# Patient Record
Sex: FEMALE | Race: WHITE | NOT HISPANIC OR LATINO | Employment: FULL TIME | URBAN - METROPOLITAN AREA
[De-identification: names, ages, dates, MRNs, and addresses within clinical notes are randomized per-mention and may not be internally consistent; named-entity substitution may affect disease eponyms.]

---

## 2017-06-01 ENCOUNTER — OFFICE VISIT (OUTPATIENT)
Dept: FAMILY MEDICINE | Facility: CLINIC | Age: 22
End: 2017-06-01
Payer: COMMERCIAL

## 2017-06-01 VITALS
TEMPERATURE: 98.8 F | HEART RATE: 72 BPM | WEIGHT: 163 LBS | BODY MASS INDEX: 25.58 KG/M2 | HEIGHT: 67 IN | SYSTOLIC BLOOD PRESSURE: 110 MMHG | DIASTOLIC BLOOD PRESSURE: 82 MMHG

## 2017-06-01 DIAGNOSIS — Z23 NEED FOR VACCINATION: ICD-10-CM

## 2017-06-01 DIAGNOSIS — Z11.3 SCREEN FOR STD (SEXUALLY TRANSMITTED DISEASE): ICD-10-CM

## 2017-06-01 DIAGNOSIS — B07.0 PLANTAR WARTS: Primary | ICD-10-CM

## 2017-06-01 DIAGNOSIS — Z30.49 ENCOUNTER FOR SURVEILLANCE OF OTHER CONTRACEPTIVE: ICD-10-CM

## 2017-06-01 PROCEDURE — 99214 OFFICE O/P EST MOD 30 MIN: CPT | Mod: 25 | Performed by: NURSE PRACTITIONER

## 2017-06-01 PROCEDURE — 87491 CHLMYD TRACH DNA AMP PROBE: CPT | Performed by: NURSE PRACTITIONER

## 2017-06-01 PROCEDURE — 90714 TD VACC NO PRESV 7 YRS+ IM: CPT | Performed by: NURSE PRACTITIONER

## 2017-06-01 PROCEDURE — 87591 N.GONORRHOEAE DNA AMP PROB: CPT | Performed by: NURSE PRACTITIONER

## 2017-06-01 PROCEDURE — 90471 IMMUNIZATION ADMIN: CPT | Performed by: NURSE PRACTITIONER

## 2017-06-01 ASSESSMENT — ENCOUNTER SYMPTOMS
SINUS PRESSURE: 0
EYE ITCHING: 0
FEVER: 0
COUGH: 0
SORE THROAT: 0
NAUSEA: 0
WHEEZING: 0
DIZZINESS: 0
LIGHT-HEADEDNESS: 0
SHORTNESS OF BREATH: 0
CONSTIPATION: 0
CHILLS: 0
HEADACHES: 0
DIARRHEA: 0
DIAPHORESIS: 0
EYE DISCHARGE: 0
FATIGUE: 0
RHINORRHEA: 0

## 2017-06-01 NOTE — NURSING NOTE
"Chief Complaint   Patient presents with     Wart       Initial /82 (BP Location: Left arm, Patient Position: Chair, Cuff Size: Adult Regular)  Pulse 72  Temp 98.8  F (37.1  C) (Tympanic)  Ht 5' 6.5\" (1.689 m)  Wt 163 lb (73.9 kg)  LMP 05/10/2017  BMI 25.91 kg/m2 Estimated body mass index is 25.91 kg/(m^2) as calculated from the following:    Height as of this encounter: 5' 6.5\" (1.689 m).    Weight as of this encounter: 163 lb (73.9 kg).  Medication Reconciliation: complete     April NITZA Knapp      "

## 2017-06-01 NOTE — PROGRESS NOTES
SUBJECTIVE:                                                    Tammi Barry is a 22 year old female who presents to clinic today for the following health issues:      Warts  Duration of complaint: cluster of warts on ball of right foot for 2 years. 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        Has had warts to bottom of right foot for the last 2-3 years. Has done multiple treatment liquid (acid) to top of hit, froze them-multiple times, and then had surgery to remove it. When had it cut out, did look normal like went away but really does not think did go away because 2-3 weeks later came back in same location. Wart does not interfere with life, only hurts when puts direct pressure on area. Has had HPV series.     Currently has paragard. Does continue to get regular periods. No concern for sexually transmitted disease. No vaginal discharge, bloating, cramping or pain.     Has appointment next week with ENT regarding snoring and difficulty breathing out of nose.     Problem list and histories reviewed & adjusted, as indicated.  Additional history: as documented    Current Outpatient Prescriptions   Medication Sig Dispense Refill     paragard intrauterine copper 1 each by Intrauterine route once       No Known Allergies    Reviewed and updated as needed this visit by clinical staff  Tobacco  Allergies  Meds  Problems  Med Hx  Surg Hx  Fam Hx  Soc Hx        Reviewed and updated as needed this visit by Provider  Problems         ROS:  Review of Systems   Constitutional: Negative for chills, diaphoresis, fatigue and fever.   HENT: Negative for ear discharge, ear pain, hearing loss, rhinorrhea, sinus pressure and sore throat.    Eyes: Negative for discharge and itching.   Respiratory: Negative for cough, shortness of breath and wheezing.    Gastrointestinal: Negative for constipation, diarrhea and nausea.   Skin: Negative for rash.         "Wart bottom of right foot     Neurological: Negative for dizziness, light-headedness and headaches.         OBJECTIVE:                                                    /82 (BP Location: Left arm, Patient Position: Chair, Cuff Size: Adult Regular)  Pulse 72  Temp 98.8  F (37.1  C) (Tympanic)  Ht 5' 6.5\" (1.689 m)  Wt 163 lb (73.9 kg)  LMP 05/10/2017  BMI 25.91 kg/m2  Body mass index is 25.91 kg/(m^2).  Physical Exam   Constitutional: She appears well-developed and well-nourished.   Cardiovascular: Normal rate and regular rhythm.    Pulmonary/Chest: Effort normal and breath sounds normal.   Musculoskeletal:        Feet:    Neurological: She is alert.   Skin: Skin is warm and dry.            ASSESSMENT/PLAN:                                                    1. Screen for STD (sexually transmitted disease)  - Neisseria gonorrhoeae PCR  - Chlamydia trachomatis PCR    2. Plantar warts  - DERMATOLOGY REFERRAL    3. Encounter for surveillance of other contraceptive  Tolerating pargard without incident     4. Need for diphtheria-tetanus-pertussis (Tdap) vaccine  Administer today     JERICHO Stacy [facility] [address]      "

## 2017-06-01 NOTE — MR AVS SNAPSHOT
After Visit Summary   6/1/2017    Tammi Barry    MRN: [id number]           Patient Information     Date Of Birth          1995        Visit Information        Provider Department      6/1/2017 11:00 AM Shelbie Akers APRN CNP [facility]        Today's Diagnoses     Plantar warts    -  1    Screen for STD (sexually transmitted disease)        Encounter for surveillance of other contraceptive           Follow-ups after your visit        Additional Services     DERMATOLOGY REFERRAL       Your provider has referred you to: [facility]: [facility] [phone]   [email or web address]    Please be aware that coverage of these services is subject to the terms and limitations of your health insurance plan.  Call member services at your health plan with any benefit or coverage questions.      Please bring the following with you to your appointment:    (1) Any X-Rays, CTs or MRIs which have been performed.  Contact the facility where they were done to arrange for  prior to your scheduled appointment.    (2) List of current medications  (3) This referral request   (4) Any documents/labs given to you for this referral                  Your next 10 appointments already scheduled     Jun 12, 2017  1:15 PM CDT   New Visit with Katie Dash MD   [facility]    [address] [address] [phone]              Who to contact     Normal or non-critical lab and imaging results will be communicated to you by MyChart, letter or phone within 4 business days after the clinic has received the results. If you do not hear from us within 7 days, please contact the clinic through MyChart or phone. If you have a critical or abnormal lab result, we will notify you by phone as soon as possible.  Submit refill requests through [employer] or call your pharmacy and they will forward the  "refill request to us. Please allow 3 business days for your refill to be completed.          If you need to speak with a  for additional information , please call: [phone]           Additional Information About Your Visit        [employer] Information     [employer] gives you secure access to your electronic health record. If you see a primary care provider, you can also send messages to your care team and make appointments. If you have questions, please call your primary care clinic.  If you do not have a primary care provider, please call [phone] and they will assist you.        Care EveryWhere ID     This is your Care EveryWhere ID. This could be used by other organizations to access your [place] medical records  [id number]        Your Vitals Were     Pulse Temperature Height Last Period BMI (Body Mass Index)       72 98.8  F (37.1  C) (Tympanic) 5' 6.5\" (1.689 m) 05/10/2017 25.91 kg/m2        Blood Pressure from Last 3 Encounters:   06/01/17 110/82   07/12/16 110/80   01/05/16 122/80    Weight from Last 3 Encounters:   06/01/17 163 lb (73.9 kg)   07/12/16 158 lb 6.4 oz (71.8 kg)   01/05/16 141 lb (64 kg)              We Performed the Following     Chlamydia trachomatis PCR     DERMATOLOGY REFERRAL     Neisseria gonorrhoeae PCR        Primary Care Provider Office Phone #    [facility] [phone]       No address on file        Thank you!     Thank you for choosing [facility]  for your care. Our goal is always to provide you with excellent care. Hearing back from our patients is one way we can continue to improve our services. Please take a few minutes to complete the written survey that you may receive in the mail after your visit with us. Thank you!             Your Updated Medication List - Protect others around you: Learn how to safely use, store and throw away your medicines at www.disposemymeds.org.          This list is accurate as of: 6/1/17 11:28 " AM.  Always use your most recent med list.                   Brand Name Dispense Instructions for use    paragard intrauterine copper      1 each by Intrauterine route once

## 2017-06-02 LAB
C TRACH DNA SPEC QL NAA+PROBE: NORMAL
N GONORRHOEA DNA SPEC QL NAA+PROBE: NORMAL
SPECIMEN SOURCE: NORMAL
SPECIMEN SOURCE: NORMAL

## 2017-06-02 NOTE — PROGRESS NOTES
Tammi,     You are negative for sexually transmitted diseases. Please use condoms with every sexual encounter.    Please call or email with any additional questions or concerns  JERICHO Novak CNP

## 2017-06-12 ENCOUNTER — OFFICE VISIT (OUTPATIENT)
Dept: OTOLARYNGOLOGY | Facility: CLINIC | Age: 22
End: 2017-06-12
Payer: COMMERCIAL

## 2017-06-12 VITALS
DIASTOLIC BLOOD PRESSURE: 91 MMHG | BODY MASS INDEX: 25.91 KG/M2 | TEMPERATURE: 98.8 F | WEIGHT: 163 LBS | HEART RATE: 73 BPM | SYSTOLIC BLOOD PRESSURE: 136 MMHG

## 2017-06-12 DIAGNOSIS — G47.33 OSA (OBSTRUCTIVE SLEEP APNEA): Primary | ICD-10-CM

## 2017-06-12 DIAGNOSIS — J34.89 NASAL OBSTRUCTION: ICD-10-CM

## 2017-06-12 PROCEDURE — 99203 OFFICE O/P NEW LOW 30 MIN: CPT | Performed by: OTOLARYNGOLOGY

## 2017-06-12 RX ORDER — FLUTICASONE PROPIONATE 50 MCG
1-2 SPRAY, SUSPENSION (ML) NASAL DAILY
Qty: 16 G | Refills: 1 | Status: SHIPPED | OUTPATIENT
Start: 2017-06-12 | End: 2023-05-20

## 2017-06-12 ASSESSMENT — PAIN SCALES - GENERAL: PAINLEVEL: NO PAIN (0)

## 2017-06-12 NOTE — MR AVS SNAPSHOT
After Visit Summary   6/12/2017    Tammi Barry    MRN: [id number]           Patient Information     Date Of Birth          1995        Visit Information        Provider Department      6/12/2017 1:15 PM Katie Dash MD [facility]        Today's Diagnoses     FARZANA (obstructive sleep apnea)    -  1    Nasal obstruction          Care Instructions    Per physician's instructions            Follow-ups after your visit        Additional Services     SLEEP EVALUATION & MANAGEMENT REFERRAL - ADULT       Please be aware that coverage of these services is subject to the terms and limitations of your health insurance plan.  Call member services at your health plan with any benefit or coverage questions.      Please bring the following to your appointment:    >>   List of current medications   >>   This referral request   >>   Any documents/labs given to you for this referral    [facility] Lab  Ph [phone] (Age 2 and up)                  Future tests that were ordered for you today     Open Future Orders        Priority Expected Expires Ordered    SLEEP EVALUATION & MANAGEMENT REFERRAL - ADULT Routine  6/12/2018 6/12/2017            Who to contact     If you have questions or need follow up information about today's clinic visit or your schedule please contact [facility] directly at [phone].  Normal or non-critical lab and imaging results will be communicated to you by MyChart, letter or phone within 4 business days after the clinic has received the results. If you do not hear from us within 7 days, please contact the clinic through MyChart or phone. If you have a critical or abnormal lab result, we will notify you by phone as soon as possible.  Submit refill requests through [employer] or call your pharmacy and they will forward the refill request to us. Please allow 3 business days for your refill to be completed.          Additional Information  About Your Visit        [employer] Information     [email or web address] gives you secure access to your electronic health record. If you see a primary care provider, you can also send messages to your care team and make appointments. If you have questions, please call your primary care clinic.  If you do not have a primary care provider, please call [phone] and they will assist you.        Care EveryWhere ID     This is your Care EveryWhere ID. This could be used by other organizations to access your [place] medical records  [id number]        Your Vitals Were     Pulse Temperature Last Period BMI (Body Mass Index)          73 98.8  F (37.1  C) (Oral) 05/10/2017 25.91 kg/m2         Blood Pressure from Last 3 Encounters:   06/12/17 (!) 136/91   06/01/17 110/82   07/12/16 110/80    Weight from Last 3 Encounters:   06/12/17 73.9 kg (163 lb)   06/01/17 73.9 kg (163 lb)   07/12/16 71.8 kg (158 lb 6.4 oz)                 Today's Medication Changes          These changes are accurate as of: 6/12/17  8:36 PM.  If you have any questions, ask your nurse or doctor.               Start taking these medicines.        Dose/Directions    fluticasone 50 MCG/ACT spray   Commonly known as:  FLONASE   Used for:  Nasal obstruction   Started by:  Katie Dash MD        Dose:  1-2 spray   Spray 1-2 sprays into both nostrils daily   Quantity:  16 g   Refills:  1            Where to get your medicines      These medications were sent to [facility] [address]  [address] [address]     Phone:  [phone]     fluticasone 50 MCG/ACT spray                Primary Care Provider Office Phone #    [facility] [phone]       No address on file        Thank you!     Thank you for choosing [facility]  for your care. Our goal is always to provide you with excellent care. Hearing back from our patients is one way we can continue to improve our services. Please  take a few minutes to complete the written survey that you may receive in the mail after your visit with us. Thank you!             Your Updated Medication List - Protect others around you: Learn how to safely use, store and throw away your medicines at www.disposemymeds.org.          This list is accurate as of: 6/12/17  8:36 PM.  Always use your most recent med list.                   Brand Name Dispense Instructions for use    fluticasone 50 MCG/ACT spray    FLONASE    16 g    Spray 1-2 sprays into both nostrils daily       paragard intrauterine copper      1 each by Intrauterine route once

## 2017-06-12 NOTE — NURSING NOTE
"Initial BP (!) 136/91 (BP Location: Right arm, Patient Position: Chair, Cuff Size: Adult Regular)  Pulse 73  Temp 98.8  F (37.1  C) (Oral)  Wt 73.9 kg (163 lb)  LMP 05/10/2017  BMI 25.91 kg/m2 Estimated body mass index is 25.91 kg/(m^2) as calculated from the following:    Height as of 6/1/17: 1.689 m (5' 6.5\").    Weight as of this encounter: 73.9 kg (163 lb). .    Loulou Dexter LPN    "

## 2017-06-13 NOTE — PROGRESS NOTES
History of Present Illness - Tammi Barry is a 22 year old female who presents with a several year history of difficulty breathing through her nose and noisy breathing in her sleep with poor sleep quality. She denies nasal trauma. She is not using any nasal medications. She has realized improvement with breathe rite strips. She is currently a Senior at [employer].    Past Medical History -   Patient Active Problem List   Diagnosis     Contraception     Need for prophylactic vaccination and inoculation against influenza     Daytime sleepiness     Snoring       Current Medications -   Current Outpatient Prescriptions:      paragard intrauterine copper, 1 each by Intrauterine route once, Disp: , Rfl:     Allergies - No Known Allergies    Social History -   Social History     Social History     Marital status: Single     Spouse name: N/A     Number of children: N/A     Years of education: N/A     Social History Main Topics     Smoking status: Never Smoker     Smokeless tobacco: Never Used     Alcohol use Yes      Comment: occas     Drug use: No     Sexual activity: Yes     Partners: Male     Birth control/ protection: Condom     Other Topics Concern     None     Social History Narrative       Family History -   Family History   Problem Relation Age of Onset     Asthma Father      Hypertension Paternal Grandmother        Review of Systems - As per HPI and PMHx, otherwise 7 system review of the head and neck negative. 10+ system review negative.    Physical Exam  BP (!) 136/91 (BP Location: Right arm, Patient Position: Chair, Cuff Size: Adult Regular)  Pulse 73  Temp 98.8  F (37.1  C) (Oral)  Wt 73.9 kg (163 lb)  LMP 05/10/2017  BMI 25.91 kg/m2  General - The patient is well nourished and well developed, and appears to have good nutritional status.  Alert and oriented to person and place, answers questions and cooperates with examination appropriately.   Head and Face - Normocephalic and atraumatic, with no  gross asymmetry noted of the contour of the facial features.  The facial nerve is intact, with strong symmetric movements.  Voice and Breathing - The patient was breathing comfortably without the use of accessory muscles. There was no wheezing, stridor, or stertor.  The patients voice was clear and strong, and had appropriate pitch and quality.  Ears - Bilateral pinna and EACs with normal appearing overlying skin. Tympanic membrane intact with good mobility on pneumatic otoscopy bilaterally. Bony landmarks of the ossicular chain are normal. The tympanic membranes are normal in appearance. No retraction, perforation, or masses.  No fluid or purulence was seen in the external canal or the middle ear.   Eyes - Extraocular movements intact.  Sclera were not icteric or injected, conjunctiva were pink and moist.  Mouth - Examination of the oral cavity showed pink, healthy oral mucosa. No lesions or ulcerations noted.  The tongue was mobile and midline, and the dentition were in good condition.    Throat - The walls of the oropharynx were smooth, pink, moist, symmetric, and had no lesions or ulcerations.  The tonsillar pillars and soft palate were symmetric.  The uvula was midline on elevation.  Neck - Normal midline excursion of the laryngotracheal complex during swallowing.  Full range of motion on passive movement.  Palpation of the occipital, submental, submandibular, internal jugular chain, and supraclavicular nodes did not demonstrate any abnormal lymph nodes or masses.  The carotid pulse was palpable bilaterally.  Palpation of the thyroid was soft and smooth, with no nodules or goiter appreciated.  The trachea was mobile and midline.  Nose - External contour is symmetric, no gross deflection or scars.  Nasal mucosa is pink and moist with no abnormal mucus.  The septum was midline and non-obstructive, turbinates of normal size and position.  No polyps, masses, or purulence noted on examination.  Heart:  Regular rate  and rhythm, no murmurs.  Lungs:  Chest clear to auscultation bilaterally          Assessment - Tammi Barry is a 22 year old female with possible FARZANA. Nasal exam demonstrates some mild septal deviation, but not convincing as a cause of nocturnal difficulty breathing. I recommended trying a course of Flonase, and also use of a nasal stent. I also placed an order for a sleep referral. Return in 2 months.       Dr. Katie Dash MD  Otolaryngology  [facility]

## 2018-01-03 ENCOUNTER — OFFICE VISIT (OUTPATIENT)
Dept: FAMILY MEDICINE | Facility: CLINIC | Age: 23
End: 2018-01-03
Payer: COMMERCIAL

## 2018-01-03 VITALS
DIASTOLIC BLOOD PRESSURE: 70 MMHG | HEIGHT: 67 IN | BODY MASS INDEX: 23.7 KG/M2 | SYSTOLIC BLOOD PRESSURE: 130 MMHG | WEIGHT: 151 LBS | HEART RATE: 68 BPM | TEMPERATURE: 98.4 F

## 2018-01-03 DIAGNOSIS — D72.829 LEUKOCYTOSIS, UNSPECIFIED TYPE: ICD-10-CM

## 2018-01-03 DIAGNOSIS — N83.292 COMPLEX CYST OF LEFT OVARY: Primary | ICD-10-CM

## 2018-01-03 LAB
BASOPHILS # BLD AUTO: 0 10E9/L (ref 0–0.2)
BASOPHILS NFR BLD AUTO: 0.2 %
DIFFERENTIAL METHOD BLD: NORMAL
EOSINOPHIL # BLD AUTO: 0.2 10E9/L (ref 0–0.7)
EOSINOPHIL NFR BLD AUTO: 1.8 %
ERYTHROCYTE [DISTWIDTH] IN BLOOD BY AUTOMATED COUNT: 13.1 % (ref 10–15)
HCT VFR BLD AUTO: 37.8 % (ref 35–47)
HGB BLD-MCNC: 13.1 G/DL (ref 11.7–15.7)
LYMPHOCYTES # BLD AUTO: 2.8 10E9/L (ref 0.8–5.3)
LYMPHOCYTES NFR BLD AUTO: 31.5 %
MCH RBC QN AUTO: 30.3 PG (ref 26.5–33)
MCHC RBC AUTO-ENTMCNC: 34.7 G/DL (ref 31.5–36.5)
MCV RBC AUTO: 88 FL (ref 78–100)
MONOCYTES # BLD AUTO: 0.7 10E9/L (ref 0–1.3)
MONOCYTES NFR BLD AUTO: 8 %
NEUTROPHILS # BLD AUTO: 5.2 10E9/L (ref 1.6–8.3)
NEUTROPHILS NFR BLD AUTO: 58.5 %
PLATELET # BLD AUTO: 319 10E9/L (ref 150–450)
RBC # BLD AUTO: 4.32 10E12/L (ref 3.8–5.2)
WBC # BLD AUTO: 8.9 10E9/L (ref 4–11)

## 2018-01-03 PROCEDURE — 36415 COLL VENOUS BLD VENIPUNCTURE: CPT | Performed by: PHYSICIAN ASSISTANT

## 2018-01-03 PROCEDURE — 85025 COMPLETE CBC W/AUTO DIFF WBC: CPT | Performed by: PHYSICIAN ASSISTANT

## 2018-01-03 PROCEDURE — 99214 OFFICE O/P EST MOD 30 MIN: CPT | Performed by: PHYSICIAN ASSISTANT

## 2018-01-03 NOTE — PATIENT INSTRUCTIONS
Please call [facility] Scheduling at [phone]  to set up the pelvic ultrasound (late Feb/Early March)

## 2018-01-03 NOTE — MR AVS SNAPSHOT
After Visit Summary   1/3/2018    Tammi Barry    MRN: [id number]           Patient Information     Date Of Birth          1995        Visit Information        Provider Department      1/3/2018 8:20 AM Neela Styles PA-C [facility]        Today's Diagnoses     Complex cyst of left ovary    -  1      Care Instructions    Please call [facility] Scheduling at [phone]  to set up the pelvic ultrasound (late Feb/Early March)            Follow-ups after your visit        Future tests that were ordered for you today     Open Future Orders        Priority Expected Expires Ordered    US Pelvic Complete w Transvaginal Routine 2/21/2018 1/3/2019 1/3/2018            Who to contact     Normal or non-critical lab and imaging results will be communicated to you by [employer], letter or phone within 4 business days after the clinic has received the results. If you do not hear from us within 7 days, please contact the clinic through [employer] or phone. If you have a critical or abnormal lab result, we will notify you by phone as soon as possible.  Submit refill requests through [employer] or call your pharmacy and they will forward the refill request to us. Please allow 3 business days for your refill to be completed.          If you need to speak with a  for additional information , please call: [phone]           Additional Information About Your Visit        [employer] Information     [employer] gives you secure access to your electronic health record. If you see a primary care provider, you can also send messages to your care team and make appointments. If you have questions, please call your primary care clinic.  If you do not have a primary care provider, please call [phone] and they will assist you.        Care EveryWhere ID     This is your Care EveryWhere ID. This could be used by other organizations to access your [place] medical records  [id number]    "     Your Vitals Were     Pulse Temperature Height BMI (Body Mass Index)          68 98.4  F (36.9  C) (Tympanic) 5' 6.5\" (1.689 m) 24.01 kg/m2         Blood Pressure from Last 3 Encounters:   01/03/18 130/70   06/12/17 (!) 136/91   06/01/17 110/82    Weight from Last 3 Encounters:   01/03/18 151 lb (68.5 kg)   06/12/17 163 lb (73.9 kg)   06/01/17 163 lb (73.9 kg)              We Performed the Following     CBC with platelets differential        Primary Care Provider Office Phone # Fax #    [facility] [phone] [address] [address]        Equal Access to Services     MARGIE KNOTT : Mahi flowers Sosandra, waaxda luqadaha, qaybta kaalmada adeegyada, asya chavez. So [facility] [phone].    ATENCIÓN: Si habla español, tiene a mendoza disposición servicios gratuitos de asistencia lingüística. Llame al [phone].    We comply with applicable federal civil rights laws and [place] laws. We do not discriminate on the basis of race, color, national origin, age, disability, sex, sexual orientation, or gender identity.            Thank you!     Thank you for choosing [facility]  for your care. Our goal is always to provide you with excellent care. Hearing back from our patients is one way we can continue to improve our services. Please take a few minutes to complete the written survey that you may receive in the mail after your visit with us. Thank you!             Your Updated Medication List - Protect others around you: Learn how to safely use, store and throw away your medicines at www.disposemymeds.org.          This list is accurate as of: 1/3/18  9:12 AM.  Always use your most recent med list.                   Brand Name Dispense Instructions for use Diagnosis    fluticasone 50 MCG/ACT spray    FLONASE    16 g    Spray 1-2 sprays into both nostrils daily    Nasal obstruction       paragard intrauterine copper     "  1 each by Intrauterine route once

## 2018-01-03 NOTE — PROGRESS NOTES
SUBJECTIVE:   Tammi Barry is a 22 year old female who presents to clinic today for the following health issues:      ED/UC Followup:    Facility:  [facility].   Date of visit: 12/28/17  Reason for visit: ovarian cyst, urine, blood work, CT  Current Status: pelvic pain comes and goes.  She has not used the stronger pain meds given at .       White count was elevated at 16.8 on 12/28/17.  No fevers.  Currently on naproxen BID x 15 days.    CT scan:   IMPRESSION:      1. There is a 4.2 x 2.9 x 2.2 cm mildly complex left ovarian cyst which may be hemorrhagic. Small amount of complex fluid in the pelvis may contain hemorrhage or proteinaceous debris from ovarian cyst rupture. Consider follow-up pelvic ultrasound in 6-12 weeks to evaluate for resolution.    2. An IUD appears in satisfactory position in the uterus.    3. The appendix is normal.            Problem list and histories reviewed & adjusted, as indicated.  Additional history: as documented    Patient Active Problem List   Diagnosis     Contraception     Need for prophylactic vaccination and inoculation against influenza     Daytime sleepiness     Snoring     Past Surgical History:   Procedure Laterality Date     ELBOW SURGERY  2009    titanium pin in place       Social History   Substance Use Topics     Smoking status: Never Smoker     Smokeless tobacco: Never Used     Alcohol use Yes      Comment: occas     Family History   Problem Relation Age of Onset     Asthma Father      Hypertension Paternal Grandmother          Current Outpatient Prescriptions   Medication Sig Dispense Refill     fluticasone (FLONASE) 50 MCG/ACT spray Spray 1-2 sprays into both nostrils daily 16 g 1     paragard intrauterine copper 1 each by Intrauterine route once       BP Readings from Last 3 Encounters:   01/03/18 130/70   06/12/17 (!) 136/91   06/01/17 110/82    Wt Readings from Last 3 Encounters:   01/03/18 151 lb (68.5 kg)   06/12/17 163 lb (73.9  "kg)   06/01/17 163 lb (73.9 kg)                        Reviewed and updated as needed this visit by clinical staffTobacco  Allergies  Meds  Med Hx  Surg Hx  Fam Hx  Soc Hx      Reviewed and updated as needed this visit by Provider         ROS:  Constitutional, HEENT, cardiovascular, pulmonary, gi and gu systems are negative, except as otherwise noted.      OBJECTIVE:   /70  Pulse 68  Temp 98.4  F (36.9  C) (Tympanic)  Ht 5' 6.5\" (1.689 m)  Wt 151 lb (68.5 kg)  BMI 24.01 kg/m2  Body mass index is 24.01 kg/(m^2).  GENERAL: healthy, alert and no distress  HEENT:  Normocephalic, atraumatic head.  Eyelids, conjunctiva, sclera normal. PERRL.  Fundal exam normal.  Tympanic membranes shiny without retraction.  Canals unremarkable.  Hearing grossly intact.  No abnormality of the nose or sinuses noted.  Normal oropharynx  ABDOMEN: soft, nontender, no hepatosplenomegaly, no masses and bowel sounds normal    Diagnostic Test Results:  none     ASSESSMENT/PLAN:       1. Complex cyst of left ovary  Recheck pelvic ultrasound in 6-12 weeks.  Pain is improving.  Discussed worrisome signs that would indicate urgent f/u (ovarian torsion).   - US Pelvic Complete w Transvaginal; Future    2. Leukocytosis, unspecified type  Likely due to inflammation from ovarian cyst, CT negative otherwise.  No signs of ENT infection.  Will recheck CBC today, continue with naproxen for now.   - CBC with platelets differential    FUTURE APPOINTMENTS:       - Follow-up US in 6-12 weeks.     Neela Styles PA-C  [facility]"

## 2018-01-03 NOTE — NURSING NOTE
"Chief Complaint   Patient presents with     Urgent Care     follow up       Initial /70  Pulse 68  Temp 98.4  F (36.9  C) (Tympanic)  Ht 5' 6.5\" (1.689 m)  Wt 151 lb (68.5 kg)  BMI 24.01 kg/m2 Estimated body mass index is 24.01 kg/(m^2) as calculated from the following:    Height as of this encounter: 5' 6.5\" (1.689 m).    Weight as of this encounter: 151 lb (68.5 kg).  Medication Reconciliation: complete   Marjorie Jacome CMA    "

## 2018-02-09 ENCOUNTER — OFFICE VISIT (OUTPATIENT)
Dept: FAMILY MEDICINE | Facility: CLINIC | Age: 23
End: 2018-02-09
Payer: COMMERCIAL

## 2018-02-09 VITALS
TEMPERATURE: 98 F | SYSTOLIC BLOOD PRESSURE: 105 MMHG | WEIGHT: 143.8 LBS | HEIGHT: 67 IN | BODY MASS INDEX: 22.57 KG/M2 | DIASTOLIC BLOOD PRESSURE: 70 MMHG

## 2018-02-09 DIAGNOSIS — N83.202 OVARIAN CYST, LEFT: Primary | ICD-10-CM

## 2018-02-09 PROCEDURE — 99213 OFFICE O/P EST LOW 20 MIN: CPT | Performed by: PHYSICIAN ASSISTANT

## 2018-02-09 NOTE — MR AVS SNAPSHOT
"              After Visit Summary   2/9/2018    Tammi Barry    MRN: [id number]           Patient Information     Date Of Birth          1995        Visit Information        Provider Department      2/9/2018 10:20 AM Neela Styles PA-C [facility] Instructions    Please call [facility] Scheduling at [phone]  to set up the pelvic ultrasound            Follow-ups after your visit        Who to contact     Normal or non-critical lab and imaging results will be communicated to you by One-Songhart, letter or phone within 4 business days after the clinic has received the results. If you do not hear from us within 7 days, please contact the clinic through C[employer] Podium[employer] or phone. If you have a critical or abnormal lab result, we will notify you by phone as soon as possible.  Submit refill requests through vendome 1699 or call your pharmacy and they will forward the refill request to us. Please allow 3 business days for your refill to be completed.          If you need to speak with a  for additional information , please call: [phone]           Additional Information About Your Visit        One-SongharbCODE Information     vendome 1699 gives you secure access to your electronic health record. If you see a primary care provider, you can also send messages to your care team and make appointments. If you have questions, please call your primary care clinic.  If you do not have a primary care provider, please call [phone] and they will assist you.        Care EveryWhere ID     This is your Care EveryWhere ID. This could be used by other organizations to access your [place] medical records  [id number]        Your Vitals Were     Temperature Height Last Period Breastfeeding? BMI (Body Mass Index)       98  F (36.7  C) (Tympanic) 5' 6.5\" (1.689 m) 01/05/2018 (Approximate) No 22.86 kg/m2        Blood Pressure from Last 3 Encounters:   02/09/18 105/70   01/03/18 130/70   06/12/17 " (!) 136/91    Weight from Last 3 Encounters:   02/09/18 143 lb 12.8 oz (65.2 kg)   01/03/18 151 lb (68.5 kg)   06/12/17 163 lb (73.9 kg)              Today, you had the following     No orders found for display       Primary Care Provider Office Phone # Fax #    [facility] [phone] [address] [facility] [address]        Equal Access to Services     MARGIE KNOTT : Hadii aad ku hadasho Soomaali, waaxda luqadaha, qaybta kaalmada adeegyada, waxay idiin hayaan adeeg kharalinda lawandy chavez. So [facility] [phone].    ATENCIÓN: Si habla español, tiene a mendoza disposición servicios gratuitos de asistencia lingüística. Llame al [phone].    We comply with applicable federal civil rights laws and [place] laws. We do not discriminate on the basis of race, color, national origin, age, disability, sex, sexual orientation, or gender identity.            Thank you!     Thank you for choosing [facility]  for your care. Our goal is always to provide you with excellent care. Hearing back from our patients is one way we can continue to improve our services. Please take a few minutes to complete the written survey that you may receive in the mail after your visit with us. Thank you!             Your Updated Medication List - Protect others around you: Learn how to safely use, store and throw away your medicines at www.disposemymeds.org.          This list is accurate as of 2/9/18 10:38 AM.  Always use your most recent med list.                   Brand Name Dispense Instructions for use Diagnosis    fluticasone 50 MCG/ACT spray    FLONASE    16 g    Spray 1-2 sprays into both nostrils daily    Nasal obstruction       paragard intrauterine copper      1 each by Intrauterine route once

## 2018-02-09 NOTE — NURSING NOTE
"Chief Complaint   Patient presents with     RECHECK       Initial /70  Temp 98  F (36.7  C) (Tympanic)  Ht 5' 6.5\" (1.689 m)  Wt 143 lb 12.8 oz (65.2 kg)  LMP 01/05/2018 (Approximate)  Breastfeeding? No  BMI 22.86 kg/m2 Estimated body mass index is 22.86 kg/(m^2) as calculated from the following:    Height as of this encounter: 5' 6.5\" (1.689 m).    Weight as of this encounter: 143 lb 12.8 oz (65.2 kg).  Medication Reconciliation: complete     Naima Delcid MA      "

## 2018-02-09 NOTE — PROGRESS NOTES
"  SUBJECTIVE:   Tammi Barry is a 23 year old female who presents to clinic today for the following health issues:      Pt presents for follow up on ruptured ovarian cyst.    Has IUD with irregular spotting recently.  Otherwise no pain and no discharge.           Problem list and histories reviewed & adjusted, as indicated.  Additional history: as documented    Patient Active Problem List   Diagnosis     Contraception     Need for prophylactic vaccination and inoculation against influenza     Daytime sleepiness     Snoring     Past Surgical History:   Procedure Laterality Date     ELBOW SURGERY  2009    titanium pin in place       Social History   Substance Use Topics     Smoking status: Never Smoker     Smokeless tobacco: Never Used     Alcohol use Yes      Comment: occas     Family History   Problem Relation Age of Onset     Asthma Father      Hypertension Paternal Grandmother          Current Outpatient Prescriptions   Medication Sig Dispense Refill     paragard intrauterine copper 1 each by Intrauterine route once       fluticasone (FLONASE) 50 MCG/ACT spray Spray 1-2 sprays into both nostrils daily (Patient not taking: Reported on 2/9/2018) 16 g 1     BP Readings from Last 3 Encounters:   02/09/18 105/70   01/03/18 130/70   06/12/17 (!) 136/91    Wt Readings from Last 3 Encounters:   02/09/18 143 lb 12.8 oz (65.2 kg)   01/03/18 151 lb (68.5 kg)   06/12/17 163 lb (73.9 kg)                    Reviewed and updated as needed this visit by clinical staff       Reviewed and updated as needed this visit by Provider         ROS:  Constitutional, HEENT, cardiovascular, pulmonary, gi and gu systems are negative, except as otherwise noted.    OBJECTIVE:     /70  Temp 98  F (36.7  C) (Tympanic)  Ht 5' 6.5\" (1.689 m)  Wt 143 lb 12.8 oz (65.2 kg)  LMP 01/05/2018 (Approximate)  Breastfeeding? No  BMI 22.86 kg/m2  Body mass index is 22.86 kg/(m^2).  GENERAL: healthy, alert and no distress  ABDOMEN: soft, " nontender, no hepatosplenomegaly, no masses and bowel sounds normal    Diagnostic Test Results:  none     ASSESSMENT/PLAN:       1. Ovarian cyst, left  Will recheck US to ensure cyst has cleared.  Number given to schedule this.       FUTURE APPOINTMENTS:       - Follow-up visit in if symptoms worsen or fail to improve as anticipated.     Neela Styles PA-C  [facility]

## 2019-10-22 NOTE — PATIENT INSTRUCTIONS
Please call [facility] Scheduling at [phone]  to set up the pelvic ultrasound (to ensure the ovarian cyst has resolved)    Preventive Health Recommendations  Female Ages 21 to 25     Yearly exam:     See your health care provider every year in order to  o Review health changes.   o Discuss preventive care.    o Review your medicines if your doctor has prescribed any.      You should be tested each year for STDs (sexually transmitted diseases).       Talk to your provider about how often you should have cholesterol testing.      Get a Pap test every three years. If you have an abnormal result, your doctor may have you test more often.      If you are at risk for diabetes, you should have a diabetes test (fasting glucose).     Shots:     Get a flu shot each year.     Get a tetanus shot every 10 years.     Consider getting the shot (vaccine) that prevents cervical cancer (Gardasil).    Nutrition:     Eat at least 5 servings of fruits and vegetables each day.    Eat whole-grain bread, whole-wheat pasta and brown rice instead of white grains and rice.    Get adequate Calcium and Vitamin D.     Lifestyle    Exercise at least 150 minutes a week each week (30 minutes a day, 5 days a week). This will help you control your weight and prevent disease.    Limit alcohol to one drink per day.    No smoking.     Wear sunscreen to prevent skin cancer.    See your dentist every six months for an exam and cleaning.

## 2019-10-22 NOTE — PROGRESS NOTES
SUBJECTIVE:   CC: Tammi Barry is an 24 year old woman who presents for preventive health visit.   *Declined influenza vaccine- may get through employer  Healthy Habits:    Do you get at least three servings of calcium containing foods daily (dairy, green leafy vegetables, etc.)? yes    Amount of exercise or daily activities, outside of work: 1-2 day(s) per week    Problems taking medications regularly not applicable    Medication side effects: No    Have you had an eye exam in the past two years? yes    Do you see a dentist twice per year? yes    Do you have sleep apnea, excessive snoring or daytime drowsiness?no    Today's PHQ-2 Score:   PHQ-2 ( 1999 Pfizer) 10/23/2019 1/5/2016   Q1: Little interest or pleasure in doing things 0 0   Q2: Feeling down, depressed or hopeless 0 0   PHQ-2 Score 0 0     Abuse: Current or Past(Physical, Sexual or Emotional)- No  Do you feel safe in your environment? Yes    Social History     Tobacco Use     Smoking status: Never Smoker     Smokeless tobacco: Never Used   Substance Use Topics     Alcohol use: Yes     Comment: occas     If you drink alcohol do you typically have >3 drinks per day or >7 drinks per week? No                     Reviewed orders with patient.  Reviewed health maintenance and updated orders accordingly - Yes  BP Readings from Last 3 Encounters:   10/23/19 102/68   02/09/18 105/70   01/03/18 130/70    Wt Readings from Last 3 Encounters:   10/23/19 72.6 kg (160 lb)   02/09/18 65.2 kg (143 lb 12.8 oz)   01/03/18 68.5 kg (151 lb)         Mammogram not appropriate for this patient based on age.    Pertinent mammograms are reviewed under the imaging tab.  History of abnormal Pap smear: NO - age 21-29 PAP every 3 years recommended  PAP / HPV 7/12/2016   PAP NIL     Reviewed and updated as needed this visit by clinical staff  Tobacco  Allergies  Meds  Med Hx  Surg Hx  Fam Hx  Soc Hx     Ally DeShong CMA    Reviewed and updated as needed this visit by  "Provider            ROS:  CONSTITUTIONAL: NEGATIVE for fever, chills, change in weight  INTEGUMENTARU/SKIN: NEGATIVE for worrisome rashes, moles or lesions  EYES: NEGATIVE for vision changes or irritation  ENT: NEGATIVE for ear, mouth and throat problems  RESP: NEGATIVE for significant cough or SOB  BREAST: NEGATIVE for masses, tenderness or discharge  CV: NEGATIVE for chest pain, palpitations or peripheral edema  GI: NEGATIVE for nausea, abdominal pain, heartburn, or change in bowel habits  : NEGATIVE for unusual urinary or vaginal symptoms. Periods are regular.  MUSCULOSKELETAL: NEGATIVE for significant arthralgias or myalgia  NEURO: NEGATIVE for weakness, dizziness or paresthesias  PSYCHIATRIC: NEGATIVE for changes in mood or affect    OBJECTIVE:   /68   Pulse 71   Temp 98.3  F (36.8  C) (Tympanic)   Resp 16   Ht 1.695 m (5' 6.75\")   Wt 72.6 kg (160 lb)   LMP 10/02/2019 (Within Days)   SpO2 98%   Breastfeeding? No   BMI 25.25 kg/m    EXAM:  GENERAL: healthy, alert and no distress  EYES: Eyes grossly normal to inspection, PERRL and conjunctivae and sclerae normal  HENT: ear canals and TM's normal, nose and mouth without ulcers or lesions  NECK: no adenopathy, no asymmetry, masses, or scars and thyroid normal to palpation  RESP: lungs clear to auscultation - no rales, rhonchi or wheezes  BREAST: normal without masses, tenderness or nipple discharge and no palpable axillary masses or adenopathy  CV: regular rate and rhythm, normal S1 S2, no S3 or S4, no murmur, click or rub, no peripheral edema and peripheral pulses strong  ABDOMEN: soft, nontender, no hepatosplenomegaly, no masses and bowel sounds normal   (female): normal female external genitalia, normal urethral meatus, vaginal mucosa pink, moist, well rugated, and normal cervix/adnexa/uterus without masses or discharge, IUD in place  MS: no gross musculoskeletal defects noted, no edema  SKIN: no suspicious lesions or rashes  NEURO: Normal " "strength and tone, mentation intact and speech normal  PSYCH: mentation appears normal, affect normal/bright    Diagnostic Test Results:  none     ASSESSMENT/PLAN:   1. Routine general medical examination at a health care facility       HEALTH CARE MAINTENANCE              Reviewed USPTF recommendations and anticipatory guidance.              See orders.   I discussed in detail with Tammi Barry the importance of cervical cancer screenings through pap smears, will repeat pap every 3 year(s).           - NEISSERIA GONORRHOEA PCR  - CHLAMYDIA TRACHOMATIS PCR  - paragard intrauterine copper device; 1 each by Intrauterine route continuous    2. Screening for cervical cancer  Due for screening  - PAP imaged thin layer screen only - recommended age 21 - 24 years    3. Need for prophylactic vaccination and inoculation against influenza  Will have this done at work    4. Ovarian cyst, left  Due for f/u  - US Pelvic Complete w Transvaginal; Future    5. IUD (intrauterine device) in place  Tolerating well      COUNSELING:   Reviewed preventive health counseling, as reflected in patient instructions       Regular exercise       Healthy diet/nutrition       Contraception    Estimated body mass index is 25.25 kg/m  as calculated from the following:    Height as of this encounter: 1.695 m (5' 6.75\").    Weight as of this encounter: 72.6 kg (160 lb).         reports that she has never smoked. She has never used smokeless tobacco.      Counseling Resources:  ATP IV Guidelines  Pooled Cohorts Equation Calculator  Breast Cancer Risk Calculator  FRAX Risk Assessment  ICSI Preventive Guidelines  Dietary Guidelines for Americans, 2010  USDA's MyPlate  ASA Prophylaxis  Lung CA Screening    Neela Styles PA-C  [facility]  "

## 2019-10-23 ENCOUNTER — OFFICE VISIT (OUTPATIENT)
Dept: FAMILY MEDICINE | Facility: CLINIC | Age: 24
End: 2019-10-23
Payer: COMMERCIAL

## 2019-10-23 VITALS
TEMPERATURE: 98.3 F | HEART RATE: 71 BPM | OXYGEN SATURATION: 98 % | RESPIRATION RATE: 16 BRPM | DIASTOLIC BLOOD PRESSURE: 68 MMHG | WEIGHT: 160 LBS | BODY MASS INDEX: 25.11 KG/M2 | SYSTOLIC BLOOD PRESSURE: 102 MMHG | HEIGHT: 67 IN

## 2019-10-23 DIAGNOSIS — Z23 NEED FOR PROPHYLACTIC VACCINATION AND INOCULATION AGAINST INFLUENZA: ICD-10-CM

## 2019-10-23 DIAGNOSIS — Z97.5 IUD (INTRAUTERINE DEVICE) IN PLACE: ICD-10-CM

## 2019-10-23 DIAGNOSIS — N83.202 OVARIAN CYST, LEFT: ICD-10-CM

## 2019-10-23 DIAGNOSIS — Z00.00 ROUTINE GENERAL MEDICAL EXAMINATION AT A HEALTH CARE FACILITY: Primary | ICD-10-CM

## 2019-10-23 DIAGNOSIS — Z12.4 SCREENING FOR CERVICAL CANCER: ICD-10-CM

## 2019-10-23 PROCEDURE — 87491 CHLMYD TRACH DNA AMP PROBE: CPT | Performed by: PHYSICIAN ASSISTANT

## 2019-10-23 PROCEDURE — 87591 N.GONORRHOEAE DNA AMP PROB: CPT | Performed by: PHYSICIAN ASSISTANT

## 2019-10-23 PROCEDURE — G0145 SCR C/V CYTO,THINLAYER,RESCR: HCPCS | Performed by: PHYSICIAN ASSISTANT

## 2019-10-23 PROCEDURE — 99395 PREV VISIT EST AGE 18-39: CPT | Performed by: PHYSICIAN ASSISTANT

## 2019-10-23 RX ORDER — COPPER 313.4 MG/1
1 INTRAUTERINE DEVICE INTRAUTERINE CONTINUOUS
Start: 2013-01-15

## 2019-10-23 ASSESSMENT — MIFFLIN-ST. JEOR: SCORE: 1504.42

## 2019-10-24 LAB
C TRACH DNA SPEC QL NAA+PROBE: NEGATIVE
N GONORRHOEA DNA SPEC QL NAA+PROBE: NEGATIVE
SPECIMEN SOURCE: NORMAL
SPECIMEN SOURCE: NORMAL

## 2019-10-25 LAB
COPATH REPORT: NORMAL
PAP: NORMAL

## 2019-10-29 ENCOUNTER — ANCILLARY PROCEDURE (OUTPATIENT)
Dept: ULTRASOUND IMAGING | Facility: CLINIC | Age: 24
End: 2019-10-29
Attending: PHYSICIAN ASSISTANT
Payer: COMMERCIAL

## 2019-10-29 DIAGNOSIS — N83.202 OVARIAN CYST, LEFT: ICD-10-CM

## 2019-10-29 PROCEDURE — 76856 US EXAM PELVIC COMPLETE: CPT

## 2019-10-29 PROCEDURE — 76830 TRANSVAGINAL US NON-OB: CPT

## 2019-11-07 NOTE — PROGRESS NOTES
Subjective     Tammi Barry is a 24 year old female who presents to clinic today for the following health issues:    HPI     Followup Pelvic Ultrasound results from 10/29/19    Delphine Caldwell CMA    She had an ultrasound in college a few years ago at [facility] and noted to have an ultrasound at that time.      Her periods were irregular in the past, currently she only has irregular spotting.      She has a paraguard IUD (5 years ago) in and periods irregular.  She doesn't recall the last time she had a full normal period in the past few years.     Has had difficulty losing weight for the past few years.    She was on OCPs in the past, however she did not tolerate those hormones very well.          Patient Active Problem List   Diagnosis     Contraception     Need for prophylactic vaccination and inoculation against influenza     Daytime sleepiness     Snoring     IUD (intrauterine device) in place     Ovarian cyst, left     PCOS (polycystic ovarian syndrome)     Past Surgical History:   Procedure Laterality Date     ELBOW SURGERY  2009    titanium pin in place       Social History     Tobacco Use     Smoking status: Never Smoker     Smokeless tobacco: Never Used   Substance Use Topics     Alcohol use: Yes     Comment: occas     Family History   Problem Relation Age of Onset     Asthma Father      Hypertension Paternal Grandmother          Current Outpatient Medications   Medication Sig Dispense Refill     metFORMIN (GLUCOPHAGE-XR) 500 MG 24 hr tablet Take 1 tablet (500 mg) by mouth daily (with dinner) 60 tablet 2     paragard intrauterine copper device 1 each by Intrauterine route continuous       fluticasone (FLONASE) 50 MCG/ACT spray Spray 1-2 sprays into both nostrils daily (Patient not taking: Reported on 2/9/2018) 16 g 1     BP Readings from Last 3 Encounters:   11/08/19 118/72   10/23/19 102/68   02/09/18 105/70    Wt Readings from Last 3 Encounters:   11/08/19 72.6 kg (160 lb)   10/23/19 72.6 kg (160 lb)    02/09/18 65.2 kg (143 lb 12.8 oz)                      Reviewed and updated as needed this visit by Provider         Review of Systems   ROS COMP: Constitutional, HEENT, cardiovascular, pulmonary, gi and gu systems are negative, except as otherwise noted.      Objective    There were no vitals taken for this visit.  There is no height or weight on file to calculate BMI.  Physical Exam   GENERAL: healthy, alert and no distress    Diagnostic Test Results:  Labs reviewed in Epic  Results for orders placed or performed in visit on 11/08/19   Comprehensive metabolic panel     Status: None   Result Value Ref Range    Sodium 136 133 - 144 mmol/L    Potassium 3.9 3.4 - 5.3 mmol/L    Chloride 104 94 - 109 mmol/L    Carbon Dioxide 22 20 - 32 mmol/L    Anion Gap 10 3 - 14 mmol/L    Glucose 81 70 - 99 mg/dL    Urea Nitrogen 17 7 - 30 mg/dL    Creatinine 0.54 0.52 - 1.04 mg/dL    GFR Estimate >90 >60 mL/min/[1.73_m2]    GFR Estimate If Black >90 >60 mL/min/[1.73_m2]    Calcium 9.2 8.5 - 10.1 mg/dL    Bilirubin Total 0.6 0.2 - 1.3 mg/dL    Albumin 4.4 3.4 - 5.0 g/dL    Protein Total 8.8 6.8 - 8.8 g/dL    Alkaline Phosphatase 79 40 - 150 U/L    ALT 29 0 - 50 U/L    AST 24 0 - 45 U/L   Hemoglobin A1c     Status: None   Result Value Ref Range    Hemoglobin A1C 4.9 0 - 5.6 %   TSH with free T4 reflex     Status: None   Result Value Ref Range    TSH 1.46 0.40 - 4.00 mU/L           Assessment & Plan     1. PCOS (polycystic ovarian syndrome)  Reviewed signs/symptoms of this condition including the pathophysiology, expected course and treatment options.    Will continue with paraguard, may consider birth control pills or Mirena in the future.    Will monitor glucose and lipid levels.     Her questions about this condition were answered today.   - Comprehensive metabolic panel  - Hemoglobin A1c  - TSH with free T4 reflex  - metFORMIN (GLUCOPHAGE-XR) 500 MG 24 hr tablet; Take 1 tablet (500 mg) by mouth daily (with dinner)  Dispense: 60  "tablet; Refill: 2    2. IUD (intrauterine device) in place  currently    3. Overweight (BMI 25.0-29.9)  Discussed with Tammi that PCOS can make it more difficult for her to lose weight.  She will continue to work on healthy diet and lifestyle changes.  May consider metformin to help with insulin resistance as well.      Reviewed side effects of Metformin.        BMI:   Estimated body mass index is 25.25 kg/m  as calculated from the following:    Height as of this encounter: 1.695 m (5' 6.75\").    Weight as of this encounter: 72.6 kg (160 lb).   Weight management plan: Discussed healthy diet and exercise guidelines            Return in about 3 months (around 2/8/2020) for lab/med recheck.    Approximately 25 minutes were spent face-to-face with patient and greater than 50% of that time was spent on counseling and coordination of care for the above issues.      Neela Styles PA-C  [facility]          "

## 2019-11-08 ENCOUNTER — OFFICE VISIT (OUTPATIENT)
Dept: FAMILY MEDICINE | Facility: CLINIC | Age: 24
End: 2019-11-08
Payer: COMMERCIAL

## 2019-11-08 VITALS
TEMPERATURE: 98 F | BODY MASS INDEX: 25.11 KG/M2 | OXYGEN SATURATION: 99 % | WEIGHT: 160 LBS | HEIGHT: 67 IN | SYSTOLIC BLOOD PRESSURE: 118 MMHG | RESPIRATION RATE: 16 BRPM | DIASTOLIC BLOOD PRESSURE: 72 MMHG | HEART RATE: 88 BPM

## 2019-11-08 DIAGNOSIS — E28.2 PCOS (POLYCYSTIC OVARIAN SYNDROME): Primary | ICD-10-CM

## 2019-11-08 DIAGNOSIS — E66.3 OVERWEIGHT (BMI 25.0-29.9): ICD-10-CM

## 2019-11-08 DIAGNOSIS — Z97.5 IUD (INTRAUTERINE DEVICE) IN PLACE: ICD-10-CM

## 2019-11-08 LAB
ALBUMIN SERPL-MCNC: 4.4 G/DL (ref 3.4–5)
ALP SERPL-CCNC: 79 U/L (ref 40–150)
ALT SERPL W P-5'-P-CCNC: 29 U/L (ref 0–50)
ANION GAP SERPL CALCULATED.3IONS-SCNC: 10 MMOL/L (ref 3–14)
AST SERPL W P-5'-P-CCNC: 24 U/L (ref 0–45)
BILIRUB SERPL-MCNC: 0.6 MG/DL (ref 0.2–1.3)
BUN SERPL-MCNC: 17 MG/DL (ref 7–30)
CALCIUM SERPL-MCNC: 9.2 MG/DL (ref 8.5–10.1)
CHLORIDE SERPL-SCNC: 104 MMOL/L (ref 94–109)
CO2 SERPL-SCNC: 22 MMOL/L (ref 20–32)
CREAT SERPL-MCNC: 0.54 MG/DL (ref 0.52–1.04)
GFR SERPL CREATININE-BSD FRML MDRD: >90 ML/MIN/{1.73_M2}
GLUCOSE SERPL-MCNC: 81 MG/DL (ref 70–99)
HBA1C MFR BLD: 4.9 % (ref 0–5.6)
POTASSIUM SERPL-SCNC: 3.9 MMOL/L (ref 3.4–5.3)
PROT SERPL-MCNC: 8.8 G/DL (ref 6.8–8.8)
SODIUM SERPL-SCNC: 136 MMOL/L (ref 133–144)
TSH SERPL DL<=0.005 MIU/L-ACNC: 1.46 MU/L (ref 0.4–4)

## 2019-11-08 PROCEDURE — 83036 HEMOGLOBIN GLYCOSYLATED A1C: CPT | Performed by: PHYSICIAN ASSISTANT

## 2019-11-08 PROCEDURE — 80053 COMPREHEN METABOLIC PANEL: CPT | Performed by: PHYSICIAN ASSISTANT

## 2019-11-08 PROCEDURE — 84443 ASSAY THYROID STIM HORMONE: CPT | Performed by: PHYSICIAN ASSISTANT

## 2019-11-08 PROCEDURE — 36415 COLL VENOUS BLD VENIPUNCTURE: CPT | Performed by: PHYSICIAN ASSISTANT

## 2019-11-08 PROCEDURE — 99214 OFFICE O/P EST MOD 30 MIN: CPT | Performed by: PHYSICIAN ASSISTANT

## 2019-11-08 RX ORDER — METFORMIN HCL 500 MG
500 TABLET, EXTENDED RELEASE 24 HR ORAL
Qty: 60 TABLET | Refills: 2 | Status: SHIPPED | OUTPATIENT
Start: 2019-11-08 | End: 2020-05-07

## 2019-11-08 ASSESSMENT — MIFFLIN-ST. JEOR: SCORE: 1504.42

## 2019-11-08 NOTE — PATIENT INSTRUCTIONS
I recommend you begin taking Metformin to help control your blood sugars/PCOS.  If you develop symptoms of weakness, trouble breathing, or muscle pain, please let me know, as this may be a sign of lactic acidosis. I recommend you avoid all alcohol, especially binge drinking while on this medication.    You will need to discontinue this medication for 2 days before and 2 days after any contrast XRays and before any major surgical procedures.     More common side effects include bloating, diarrhea, excess gas and nausea.  I recommend we slowly taper you up on this medication as follows.       Dinner  Week 1 & 2    1 tab  Week 3 & 4  2 tabs           Patient Education     Polycystic Ovary Syndrome  Polycystic ovary syndrome (PCOS) causes harmless, small cysts in the ovaries and other symptoms. PCOS is caused by certain hormones being out of balance. The word  syndrome  means a group of symptoms. Women with PCOS may have no periods, irregular periods, or very long periods.    Your ovaries  Women store their eggs in their ovaries. Each egg is in a capsule called a follicle. Normally during the reproductive years, one follicle grows to produce a mature egg each month. This egg is released during ovulation and the follicle dissolves.  Hormones out of balance  With polycystic ovary syndrome (PCOS), the hormones that control ovulation are out of balance. These include estrogen, progesterone, and androgen. As a result, ovulation may not occur. Instead, the follicle stays enlarged. This is a fluid-filled sac called a cyst. Over time, the ovaries fill with many small cysts. This is why they are called  poly  or many  cystic  ovaries. In some women, the ovaries also make too much androgen.  PCOS symptoms  Women with PCOS may also have one or more of these symptoms:    Acne    Hair growth on the face and other parts of the body    Patches of thickened, velvety, darkened skin called acanthosis nigricans    Trouble getting pregnant  (fertility problems)    Weight gain, especially around the waist   Women with PCOS are also at increased risk of developing cancer of the uterine lining, diabetes, and heart disease.   Date Last Reviewed: 6/1/2017 2000-2018 [employer]. [address] [place] [address], [place] [address]. All rights reserved. This information is not intended as a substitute for professional medical care. Always follow your healthcare professional's instructions.

## 2019-11-09 PROBLEM — E28.2 PCOS (POLYCYSTIC OVARIAN SYNDROME): Status: ACTIVE | Noted: 2019-11-09

## 2019-11-09 PROBLEM — E66.3 OVERWEIGHT (BMI 25.0-29.9): Status: ACTIVE | Noted: 2019-11-09

## 2019-11-14 ENCOUNTER — OFFICE VISIT (OUTPATIENT)
Dept: FAMILY MEDICINE | Facility: CLINIC | Age: 24
End: 2019-11-14
Payer: COMMERCIAL

## 2019-11-14 ENCOUNTER — MYC MEDICAL ADVICE (OUTPATIENT)
Dept: FAMILY MEDICINE | Facility: CLINIC | Age: 24
End: 2019-11-14

## 2019-11-14 VITALS — HEART RATE: 78 BPM | TEMPERATURE: 98.9 F | SYSTOLIC BLOOD PRESSURE: 136 MMHG | DIASTOLIC BLOOD PRESSURE: 92 MMHG

## 2019-11-14 DIAGNOSIS — Z71.84 TRAVEL ADVICE ENCOUNTER: Primary | ICD-10-CM

## 2019-11-14 PROCEDURE — 99402 PREV MED CNSL INDIV APPRX 30: CPT | Mod: 25 | Performed by: NURSE PRACTITIONER

## 2019-11-14 PROCEDURE — 90686 IIV4 VACC NO PRSV 0.5 ML IM: CPT | Mod: GA | Performed by: NURSE PRACTITIONER

## 2019-11-14 PROCEDURE — 90472 IMMUNIZATION ADMIN EACH ADD: CPT | Mod: GA | Performed by: NURSE PRACTITIONER

## 2019-11-14 PROCEDURE — 90471 IMMUNIZATION ADMIN: CPT | Mod: GA | Performed by: NURSE PRACTITIONER

## 2019-11-14 PROCEDURE — 90691 TYPHOID VACCINE IM: CPT | Mod: GA | Performed by: NURSE PRACTITIONER

## 2019-11-14 RX ORDER — AZITHROMYCIN 500 MG/1
500 TABLET, FILM COATED ORAL DAILY
Qty: 3 TABLET | Refills: 0 | Status: SHIPPED | OUTPATIENT
Start: 2019-11-14 | End: 2019-11-17

## 2019-11-14 NOTE — PROGRESS NOTES
Nurse Note      Itinerary:  [facility]      Departure Date: 11/22/2019      Return Date: 11/30/2019      Length of Trip 7 days      Reason for Travel: Tourism           Urban or rural: both      Accommodations: Hotel        IMMUNIZATION HISTORY  Have you received any immunizations within the past 4 weeks?  No  Have you ever fainted from having your blood drawn or from an injection?  No  Have you ever had a fever reaction to vaccination?  No  Have you ever had any bad reaction or side effect from any vaccination?  No  Have you ever had hepatitis A or B vaccine?  No  Do you live (or work closely) with anyone who has AIDS, an AIDS-like condition, any other immune disorder or who is on chemotherapy for cancer?  No  Do you have a family history of immunodeficiency?  No  Have you received any injection of immune globulin or any blood products during the past 12 months?  No    Patient roomed by All.VIKTORIYA Nguyen CLEMENTINE Barry is a 24 year old female seen today alone for counsultation for international travel to the stated countries.   Patient will be departing in  1 week(s) and  traveling with friend.      Patient itinerary :  will be in the urban region of [facility] > stay in [facility] 3 days >  [facility] which presents risk for Dengue Fever and [place]. exposure.      Patient's activities will include sightseeing and beach activities (salt water).    Patient's country of birth is [place]    Special medical concerns: PCOS starting Metformin   Pre-travel questionnaire was completed by patient and reviewed by provider.     Vitals: LMP  (LMP Unknown)   BMI= There is no height or weight on file to calculate BMI.    EXAM:  General:  Well-nourished, well-developed in no acute distress.  Appears to be stated age, interacts appropriately and expresses understanding of information given to patient.    Current Outpatient Medications   Medication Sig Dispense Refill     fluticasone (FLONASE) 50 MCG/ACT spray Spray 1-2  sprays into both nostrils daily (Patient not taking: Reported on 2/9/2018) 16 g 1     metFORMIN (GLUCOPHAGE-XR) 500 MG 24 hr tablet Take 1 tablet (500 mg) by mouth daily (with dinner) 60 tablet 2     paragard intrauterine copper device 1 each by Intrauterine route continuous       Patient Active Problem List   Diagnosis     Contraception     Need for prophylactic vaccination and inoculation against influenza     Daytime sleepiness     Snoring     IUD (intrauterine device) in place     Ovarian cyst, left     PCOS (polycystic ovarian syndrome)     Overweight (BMI 25.0-29.9)     No Known Allergies      Immunizations discussed include:   Hepatitis A:  Up to date  Hepatitis B: Up to date  Influenza: Ordered/given today, risks, benefits and side effects reviewed  Typhoid: Ordered/given today, risks, benefits and side effects reviewed  Rabies: Declined  reviewed managment of a animal bite or scratch (washing wound, seek medical care within 24 hours for post exposure prophylaxis ) and Insufficient time to vaccinate  Yellow Fever: Not indicated  Japanese Encephalitis: Not indicated  Meningococcus: Not indicated  Tetanus/Diphtheria: Up to date  Measles/Mumps/Rubella: Up to date  Cholera: Not needed  Polio: Up to date  Pneumococcal: Under age of 65  Varicella: Up to date  Zostavax:  Not indicated  Shingrix: Not indicated and Insufficient time to vaccinate  HPV:  Up to date  TB:  Low risk     Altitude Exposure on this trip: no  Past tolerance to Altitude: na    ASSESSMENT/PLAN:    ICD-10-CM    1. Travel advice encounter Z71.84 azithromycin (ZITHROMAX) 500 MG tablet     I have reviewed general recommendations for safe travel   including: food/water precautions, insect precautions, safer sex   practices given high prevalence of Zika, HIV and other STDs,   roadway safety. Educational materials and [employer] report provided.    Malaraia prophylaxis recommended: none  Symptomatic treatment for traveler's diarrhea:  azithromycin  Altitude illness prevention and treatment: none      Evacuation insurance advised and resources were provided to patient.    Total visit time 30 minutes  with over 50% of time spent counseling patient as detailed above.    Marcia Calabrese CNP

## 2019-11-14 NOTE — NURSING NOTE
Chief Complaint   Patient presents with     [facility] Clinic     Prior to immunization administration, verified patients identity using patient s name and date of birth. Please see Immunization Activity for additional information.     Screening Questionnaire for Adult Immunization    Are you sick today?   No   Do you have allergies to medications, food, a vaccine component or latex?   No   Have you ever had a serious reaction after receiving a vaccination?   No   Do you have a long-term health problem with heart disease, lung disease, asthma, kidney disease, metabolic disease (e.g. diabetes), anemia, or other blood disorder?   No   Do you have cancer, leukemia, HIV/AIDS, or any other immune system problem?   No   In the past 3 months, have you taken medications that affect  your immune system, such as prednisone, other steroids, or anticancer drugs; drugs for the treatment of rheumatoid arthritis, Crohn s disease, or psoriasis; or have you had radiation treatments?   No   Have you had a seizure, or a brain or other nervous system problem?   No   During the past year, have you received a transfusion of blood or blood     products, or been given immune (gamma) globulin or antiviral drug?   No   For women: Are you pregnant or is there a chance you could become        pregnant during the next month?   No   Have you received any vaccinations in the past 4 weeks?   No     Immunization questionnaire answers were all negative.        Per orders of ESPERANZA MCCABE, injection of THYROID, AND INFLUENZA given by Amy Nguyen CMA. Patient instructed to remain in clinic for 15 minutes afterwards, and to report any adverse reaction to me immediately.       Screening performed by Amy Nguyen CMA on 11/14/2019 at 9:16 AM.

## 2019-11-14 NOTE — PATIENT INSTRUCTIONS
Today November 14, 2019 you received the    Flu Vaccine    Typhoid - injectable. This vaccine is valid for two years.   .    These appointments can be made as a NURSE ONLY visit.    **It is very important for the vaccinations to be given on the scheduled day(s), this helps ensure you receive the full effectiveness of the vaccine.**    Please call [facility] with any questions [phone]    Thank you for visiting [facility]

## 2019-11-15 ENCOUNTER — OFFICE VISIT (OUTPATIENT)
Dept: DERMATOLOGY | Facility: CLINIC | Age: 24
End: 2019-11-15
Payer: COMMERCIAL

## 2019-11-15 VITALS — DIASTOLIC BLOOD PRESSURE: 90 MMHG | SYSTOLIC BLOOD PRESSURE: 124 MMHG | OXYGEN SATURATION: 98 % | HEART RATE: 90 BPM

## 2019-11-15 DIAGNOSIS — B07.0 PLANTAR WART: ICD-10-CM

## 2019-11-15 DIAGNOSIS — D48.5 NEOPLASM OF UNCERTAIN BEHAVIOR OF SKIN: Primary | ICD-10-CM

## 2019-11-15 PROCEDURE — 99214 OFFICE O/P EST MOD 30 MIN: CPT | Mod: 25 | Performed by: PHYSICIAN ASSISTANT

## 2019-11-15 PROCEDURE — 11900 INJECT SKIN LESIONS </W 7: CPT | Mod: 59 | Performed by: PHYSICIAN ASSISTANT

## 2019-11-15 PROCEDURE — 11102 TANGNTL BX SKIN SINGLE LES: CPT | Performed by: PHYSICIAN ASSISTANT

## 2019-11-15 PROCEDURE — 88305 TISSUE EXAM BY PATHOLOGIST: CPT | Mod: TC | Performed by: PHYSICIAN ASSISTANT

## 2019-11-15 NOTE — PATIENT INSTRUCTIONS
Wound Care Instructions     FOR SUPERFICIAL WOUNDS     [facility] [phone]    [facility] [phone]                       AFTER 24 HOURS YOU SHOULD REMOVE THE BANDAGE AND BEGIN DAILY DRESSING CHANGES AS FOLLOWS:     1) Remove Dressing.     2) Clean and dry the area with tap water using a Q-tip or sterile gauze pad.     3) Apply Vaseline, Aquaphor, Polysporin ointment or Bacitracin ointment over entire wound.  Do NOT use Neosporin ointment.     4) Cover the wound with a band-aid, or a sterile non-stick gauze pad and micropore paper tape      REPEAT THESE INSTRUCTIONS AT LEAST ONCE A DAY UNTIL THE WOUND HAS COMPLETELY HEALED.    It is an old wives tale that a wound heals better when it is exposed to air and allowed to dry out. The wound will heal faster with a better cosmetic result if it is kept moist with ointment and covered with a bandage.    **Do not let the wound dry out.**      Supplies Needed:      *Cotton tipped applicators (Q-tips)    *Polysporin Ointment or Bacitracin Ointment (NOT NEOSPORIN)    *Band-aids or non-stick gauze pads and micropore paper tape.      PATIENT INFORMATION:    During the healing process you will notice a number of changes. All wounds develop a small halo of redness surrounding the wound.  This means healing is occurring. Severe itching with extensive redness usually indicates sensitivity to the ointment or bandage tape used to dress the wound.  You should call our office if this develops.      Swelling  and/or discoloration around your surgical site is common, particularly when performed around the eye.    All wounds normally drain.  The larger the wound the more drainage there will be.  After 7-10 days, you will notice the wound beginning to shrink and new skin will begin to grow.  The wound is healed when you can see skin has formed over the entire area.  A healed wound has a healthy, shiny look to the surface and is red to dark pink in color  to normalize.  Wounds may take approximately 4-6 weeks to heal.  Larger wounds may take 6-8 weeks.  After the wound is healed you may discontinue dressing changes.    You may experience a sensation of tightness as your wound heals. This is normal and will gradually subside.    Your healed wound may be sensitive to temperature changes. This sensitivity improves with time, but if you re having a lot of discomfort, try to avoid temperature extremes.    Patients frequently experience itching after their wound appears to have healed because of the continue healing under the skin.  Plain Vaseline will help relieve the itching.        POSSIBLE COMPLICATIONS    BLEEDIN. Leave the bandage in place.  2. Use tightly rolled up gauze or a cloth to apply direct pressure over the bandage for 30  minutes.  3. Reapply pressure for an additional 30 minutes if necessary  4. Use additional gauze and tape to maintain pressure once the bleeding has stopped.        CANTHARIDIN (CANTHARONE) TREATMENT  FOR  WART  Cantharone/Cantharidin is a blistering solution which causes  redness, swelling, and fluid accumulation under the wart.  24 hours after treatment the area(s) should be washed carefully  with soap and water. DO NOT SCRUB the area(s) there should  be a film over the treated area(s) after washing  If severer stinging or burning occurs before the 24 hours it is ok  to wash the area sooner - Again DO NOT SCRUB the area(s)  there should be a film over the treated area(s)  Blistering will start to form in about 3 to 8 hours post  treatment  Some patient may be very sensitive to the Cantharone and may  experience tingling or burning sensation at the treatment site(s)  Tylenol/Advil may be taken for discomfort.  In 1-2 weeks crusting and peeling occurs- Vaseline may be  applied (using a Q-tip) to help the healing process.  If the area(s) become very red, painful to touch, and/or looks  infected contact the office to speak to  your  Provider    Multiple treatments may be needed to treat wart.

## 2019-11-15 NOTE — PROGRESS NOTES
Tammi Barry is a 24 year old year old female patient here today for mole on left side of scalp. Present for a few years. Will get irritated easily since it is raised. She also has a wart that she has had treated with candin and cantharidin over a year ago. Patient has no other skin complaints today.  Remainder of the HPI, Meds, PMH, Allergies, FH, and SH was reviewed in chart.   History reviewed. No pertinent past medical history.    Past Surgical History:   Procedure Laterality Date     ELBOW SURGERY  2009    titanium pin in place        Family History   Problem Relation Age of Onset     Pituitary Disorder Mother         adenoma     Asthma Father      Hypertension Paternal Grandmother        Social History     Socioeconomic History     Marital status: Single     Spouse name: Not on file     Number of children: Not on file     Years of education: Not on file     Highest education level: Not on file   Occupational History     Not on file   Social Needs     Financial resource strain: Not on file     Food insecurity:     Worry: Not on file     Inability: Not on file     Transportation needs:     Medical: Not on file     Non-medical: Not on file   Tobacco Use     Smoking status: Never Smoker     Smokeless tobacco: Never Used   Substance and Sexual Activity     Alcohol use: Yes     Comment: occas     Drug use: No     Sexual activity: Yes     Partners: Male     Birth control/protection: Condom   Lifestyle     Physical activity:     Days per week: Not on file     Minutes per session: Not on file     Stress: Not on file   Relationships     Social connections:     Talks on phone: Not on file     Gets together: Not on file     Attends Baptism service: Not on file     Active member of club or organization: Not on file     Attends meetings of clubs or organizations: Not on file     Relationship status: Not on file     Intimate partner violence:     Fear of current or ex partner: Not on file     Emotionally abused: Not on  file     Physically abused: Not on file     Forced sexual activity: Not on file   Other Topics Concern     Parent/sibling w/ CABG, MI or angioplasty before 65F 55M? Not Asked   Social History Narrative     Not on file       Outpatient Encounter Medications as of 11/15/2019   Medication Sig Dispense Refill     azithromycin (ZITHROMAX) 500 MG tablet Take 1 tablet (500 mg) by mouth daily for 3 doses Take 1 tablet a day for up to 3 days for severe diarrhea 3 tablet 0     metFORMIN (GLUCOPHAGE-XR) 500 MG 24 hr tablet Take 1 tablet (500 mg) by mouth daily (with dinner) 60 tablet 2     paragard intrauterine copper device 1 each by Intrauterine route continuous       fluticasone (FLONASE) 50 MCG/ACT spray Spray 1-2 sprays into both nostrils daily (Patient not taking: Reported on 2/9/2018) 16 g 1     No facility-administered encounter medications on file as of 11/15/2019.              Review Of Systems  Skin: As above  Eyes: negative  Ears/Nose/Throat: negative  Respiratory: No shortness of breath, dyspnea on exertion, cough, or hemoptysis  Cardiovascular: negative  Gastrointestinal: negative  Genitourinary: negative  Musculoskeletal: negative  Neurologic: negative  Psychiatric: negative  Hematologic/Lymphatic/Immunologic: negative  Endocrine: negative      O:   NAD, WDWN, Alert & Oriented, Mood & Affect wnl, Vitals stable   Here today alone   BP (!) 124/90   Pulse 90   LMP  (LMP Unknown)   SpO2 98%    General appearance normal   Vitals stable   Alert, oriented and in no acute distress     0.5 cm brown papule on left temporal scalp  Verrucous papule on right plantar foot     Eyes: Conjunctivae/lids:Normal     ENT: Lips: normal    MSK:Normal    Cardiovascular: peripheral edema none    Pulm: Breathing Normal    Neuro/Psych: Orientation:Normal; Mood/Affect:Normal  A/P:  1. R/O inflamed nevus on left temporal scalp  TANGENTIAL BIOPSY SENT OUT:  After consent, anesthesia with LEC and prep, tangential excision performed and  specimen sent out for permanent section histology.  No complications and routine wound care. Patient told to call our office in 1-2 weeks for result.      2. Plantar wart   IL Candin: PGACAC discussed.  Risks including but not limited to injection site reaction, bruising, no resolution.  All questions answered and entertained to patient s satisfaction.  Informed consent obtained.  IL Candin in concentration of 1 unit/ 0.1 ml was injected ID to right plantar wart.  Total injected was  1 units.  Patient tolerated without complications and given wound care instructions, including not to move product around.  Return in 4 weeks for follow-up and possible additional IL Candin.    CANTHARIDIN (CANTHARONE) TREATMENT  FOR  warts  Cantharone/Cantharidin is a blistering solution which causes  redness, swelling, and fluid accumulation under the wart  12-24 hours after treatment the area(s) should be washed carefully  with soap and water. DO NOT SCRUB the area(s) there should  be a film over the treated area(s) after washing  If severer stinging or burning occurs before the 4 hours it is ok  to wash the area sooner - Again DO NOT SCRUB the area(s)  there should be a film over the treated area(s)  Blistering with start to form in about 3 to 8 hours post  treatment  Some patient may be very sensitive to the Cantharone and may  experience tingling or burning sensation at the treatment site(s)  Tylenol/Advil may be taken for discomfort.  In 1-2 weeks crusting and peeling occurs- Vaseline may be  applied (using a Q-tip) to help the healing process.  If the area(s) become very red, painful to touch, and/or looks  infected contact the office to speak to your  Provider    Multiple treatments may be needed to treat warts.  Tammi to follow up with Primary Care provider regarding elevated blood pressure.

## 2019-11-15 NOTE — LETTER
11/15/2019         RE: Tammi Barry  [address] [facility] [id number]        Dear Colleague,    Thank you for referring your patient, Tammi Barry, to the [facility]. Please see a copy of my visit note below.    Tammi Barry is a 24 year old year old female patient here today for mole on left side of scalp. Present for a few years. Will get irritated easily since it is raised. She also has a wart that she has had treated with candin and cantharidin over a year ago. Patient has no other skin complaints today.  Remainder of the HPI, Meds, PMH, Allergies, FH, and SH was reviewed in chart.   History reviewed. No pertinent past medical history.    Past Surgical History:   Procedure Laterality Date     ELBOW SURGERY  2009    titanium pin in place        Family History   Problem Relation Age of Onset     Pituitary Disorder Mother         adenoma     Asthma Father      Hypertension Paternal Grandmother        Social History     Socioeconomic History     Marital status: Single     Spouse name: Not on file     Number of children: Not on file     Years of education: Not on file     Highest education level: Not on file   Occupational History     Not on file   Social Needs     Financial resource strain: Not on file     Food insecurity:     Worry: Not on file     Inability: Not on file     Transportation needs:     Medical: Not on file     Non-medical: Not on file   Tobacco Use     Smoking status: Never Smoker     Smokeless tobacco: Never Used   Substance and Sexual Activity     Alcohol use: Yes     Comment: occas     Drug use: No     Sexual activity: Yes     Partners: Male     Birth control/protection: Condom   Lifestyle     Physical activity:     Days per week: Not on file     Minutes per session: Not on file     Stress: Not on file   Relationships     Social connections:     Talks on phone: Not on file     Gets together: Not on file     Attends Buddhism service: Not on file     Active  member of club or organization: Not on file     Attends meetings of clubs or organizations: Not on file     Relationship status: Not on file     Intimate partner violence:     Fear of current or ex partner: Not on file     Emotionally abused: Not on file     Physically abused: Not on file     Forced sexual activity: Not on file   Other Topics Concern     Parent/sibling w/ CABG, MI or angioplasty before 65F 55M? Not Asked   Social History Narrative     Not on file       Outpatient Encounter Medications as of 11/15/2019   Medication Sig Dispense Refill     azithromycin (ZITHROMAX) 500 MG tablet Take 1 tablet (500 mg) by mouth daily for 3 doses Take 1 tablet a day for up to 3 days for severe diarrhea 3 tablet 0     metFORMIN (GLUCOPHAGE-XR) 500 MG 24 hr tablet Take 1 tablet (500 mg) by mouth daily (with dinner) 60 tablet 2     paragard intrauterine copper device 1 each by Intrauterine route continuous       fluticasone (FLONASE) 50 MCG/ACT spray Spray 1-2 sprays into both nostrils daily (Patient not taking: Reported on 2/9/2018) 16 g 1     No facility-administered encounter medications on file as of 11/15/2019.              Review Of Systems  Skin: As above  Eyes: negative  Ears/Nose/Throat: negative  Respiratory: No shortness of breath, dyspnea on exertion, cough, or hemoptysis  Cardiovascular: negative  Gastrointestinal: negative  Genitourinary: negative  Musculoskeletal: negative  Neurologic: negative  Psychiatric: negative  Hematologic/Lymphatic/Immunologic: negative  Endocrine: negative      O:   NAD, WDWN, Alert & Oriented, Mood & Affect wnl, Vitals stable   Here today alone   BP (!) 124/90   Pulse 90   LMP  (LMP Unknown)   SpO2 98%    General appearance normal   Vitals stable   Alert, oriented and in no acute distress     0.5 cm brown papule on left temporal scalp  Verrucous papule on right plantar foot     Eyes: Conjunctivae/lids:Normal     ENT: Lips: normal    MSK:Normal    Cardiovascular: peripheral edema  none    Pulm: Breathing Normal    Neuro/Psych: Orientation:Normal; Mood/Affect:Normal  A/P:  1. R/O inflamed nevus on left temporal scalp  TANGENTIAL BIOPSY SENT OUT:  After consent, anesthesia with LEC and prep, tangential excision performed and specimen sent out for permanent section histology.  No complications and routine wound care. Patient told to call our office in 1-2 weeks for result.      2. Plantar wart   IL Candin: PGACAC discussed.  Risks including but not limited to injection site reaction, bruising, no resolution.  All questions answered and entertained to patient s satisfaction.  Informed consent obtained.  IL Candin in concentration of 1 unit/ 0.1 ml was injected ID to right plantar wart.  Total injected was  1 units.  Patient tolerated without complications and given wound care instructions, including not to move product around.  Return in 4 weeks for follow-up and possible additional IL Candin.    CANTHARIDIN (CANTHARONE) TREATMENT  FOR  warts  Cantharone/Cantharidin is a blistering solution which causes  redness, swelling, and fluid accumulation under the wart  12-24 hours after treatment the area(s) should be washed carefully  with soap and water. DO NOT SCRUB the area(s) there should  be a film over the treated area(s) after washing  If severer stinging or burning occurs before the 4 hours it is ok  to wash the area sooner - Again DO NOT SCRUB the area(s)  there should be a film over the treated area(s)  Blistering with start to form in about 3 to 8 hours post  treatment  Some patient may be very sensitive to the Cantharone and may  experience tingling or burning sensation at the treatment site(s)  Tylenol/Advil may be taken for discomfort.  In 1-2 weeks crusting and peeling occurs- Vaseline may be  applied (using a Q-tip) to help the healing process.  If the area(s) become very red, painful to touch, and/or looks  infected contact the office to speak to your  Provider    Multiple treatments  may be needed to treat warts.  Tammi to follow up with Primary Care provider regarding elevated blood pressure.      Again, thank you for allowing me to participate in the care of your patient.        Sincerely,        Candace Vega PA-C

## 2019-11-19 LAB — COPATH REPORT: NORMAL

## 2019-12-13 ENCOUNTER — OFFICE VISIT (OUTPATIENT)
Dept: DERMATOLOGY | Facility: CLINIC | Age: 24
End: 2019-12-13
Payer: COMMERCIAL

## 2019-12-13 VITALS — HEART RATE: 80 BPM | DIASTOLIC BLOOD PRESSURE: 89 MMHG | SYSTOLIC BLOOD PRESSURE: 131 MMHG | OXYGEN SATURATION: 97 %

## 2019-12-13 DIAGNOSIS — B07.0 PLANTAR WART: Primary | ICD-10-CM

## 2019-12-13 PROCEDURE — 17110 DESTRUCTION B9 LES UP TO 14: CPT | Performed by: PHYSICIAN ASSISTANT

## 2019-12-13 NOTE — PROGRESS NOTES
Tammi Barry is a 24 year old year old female patient here today for recheck wart.  Patient notes that wart is much smaller. She had cantharidin and candin LOV, which worked well. Patient has no other skin complaints today.  Remainder of the HPI, Meds, PMH, Allergies, FH, and SH was reviewed in chart.    History reviewed. No pertinent past medical history.    Past Surgical History:   Procedure Laterality Date     ELBOW SURGERY  2009    titanium pin in place        Family History   Problem Relation Age of Onset     Pituitary Disorder Mother         adenoma     Asthma Father      Hypertension Paternal Grandmother        Social History     Socioeconomic History     Marital status: Single     Spouse name: Not on file     Number of children: Not on file     Years of education: Not on file     Highest education level: Not on file   Occupational History     Not on file   Social Needs     Financial resource strain: Not on file     Food insecurity:     Worry: Not on file     Inability: Not on file     Transportation needs:     Medical: Not on file     Non-medical: Not on file   Tobacco Use     Smoking status: Never Smoker     Smokeless tobacco: Never Used   Substance and Sexual Activity     Alcohol use: Yes     Comment: occas     Drug use: No     Sexual activity: Yes     Partners: Male     Birth control/protection: Condom   Lifestyle     Physical activity:     Days per week: Not on file     Minutes per session: Not on file     Stress: Not on file   Relationships     Social connections:     Talks on phone: Not on file     Gets together: Not on file     Attends Spiritism service: Not on file     Active member of club or organization: Not on file     Attends meetings of clubs or organizations: Not on file     Relationship status: Not on file     Intimate partner violence:     Fear of current or ex partner: Not on file     Emotionally abused: Not on file     Physically abused: Not on file     Forced sexual activity: Not on  file   Other Topics Concern     Parent/sibling w/ CABG, MI or angioplasty before 65F 55M? Not Asked   Social History Narrative     Not on file       Outpatient Encounter Medications as of 2019   Medication Sig Dispense Refill     metFORMIN (GLUCOPHAGE-XR) 500 MG 24 hr tablet Take 1 tablet (500 mg) by mouth daily (with dinner) 60 tablet 2     paragard intrauterine copper device 1 each by Intrauterine route continuous       [] azithromycin (ZITHROMAX) 500 MG tablet Take 1 tablet (500 mg) by mouth daily for 3 doses Take 1 tablet a day for up to 3 days for severe diarrhea 3 tablet 0     fluticasone (FLONASE) 50 MCG/ACT spray Spray 1-2 sprays into both nostrils daily (Patient not taking: Reported on 2018) 16 g 1     No facility-administered encounter medications on file as of 2019.              Review Of Systems  Skin: As above  Eyes: negative  Ears/Nose/Throat: negative  Respiratory: No shortness of breath, dyspnea on exertion, cough, or hemoptysis  Cardiovascular: negative  Gastrointestinal: negative  Genitourinary: negative  Musculoskeletal: negative  Neurologic: negative  Psychiatric: negative  Hematologic/Lymphatic/Immunologic: negative  Endocrine: negative      O:   NAD, WDWN, Alert & Oriented, Mood & Affect wnl, Vitals stable   Here today alone   /89   Pulse 80   SpO2 97%    General appearance normal   Vitals stable   Alert, oriented and in no acute distress     Verrucous papule on right foot       Eyes: Conjunctivae/lids:Normal     ENT: Lips: normal    MSK:Normal    Cardiovascular: peripheral edema none    Pulm: Breathing Normal    Neuro/Psych: Orientation:Alert and Orientedx3 ; Mood/Affect:normal   A/P:  1. Plantar wart on foot   IL Candin: PGACAC discussed.  Risks including but not limited to injection site reaction, bruising, no resolution.  All questions answered and entertained to patient s satisfaction.  Informed consent obtained.  IL Candin in concentration of 1 unit/ 0.1 ml  was injected ID to right plantar wart.  Total injected was  1 units.  Patient tolerated without complications and given wound care instructions, including not to move product around.  Return in 4 weeks for follow-up and possible additional IL Candin.    CANTHARIDIN (CANTHARONE) TREATMENT  FOR  warts  Cantharone/Cantharidin is a blistering solution which causes  redness, swelling, and fluid accumulation under the wart  12-24 hours after treatment the area(s) should be washed carefully  with soap and water. DO NOT SCRUB the area(s) there should  be a film over the treated area(s) after washing  If severer stinging or burning occurs before the 4 hours it is ok  to wash the area sooner - Again DO NOT SCRUB the area(s)  there should be a film over the treated area(s)  Blistering with start to form in about 3 to 8 hours post  treatment  Some patient may be very sensitive to the Cantharone and may  experience tingling or burning sensation at the treatment site(s)  Tylenol/Advil may be taken for discomfort.  In 1-2 weeks crusting and peeling occurs- Vaseline may be  applied (using a Q-tip) to help the healing process.  If the area(s) become very red, painful to touch, and/or looks  infected contact the office to speak to your  Provider     Multiple treatments may be needed to treat warts.

## 2019-12-13 NOTE — LETTER
12/13/2019         RE: Tammi Barry  [address] [facility] [address]        Dear Colleague,    Thank you for referring your patient, Tammi Barry, to the [facility]. Please see a copy of my visit note below.    Tammi Barry is a 24 year old year old female patient here today for recheck wart.  Patient notes that wart is much smaller. She had cantharidin and candin LOV, which worked well. Patient has no other skin complaints today.  Remainder of the HPI, Meds, PMH, Allergies, FH, and SH was reviewed in chart.    History reviewed. No pertinent past medical history.    Past Surgical History:   Procedure Laterality Date     ELBOW SURGERY  2009    titanium pin in place        Family History   Problem Relation Age of Onset     Pituitary Disorder Mother         adenoma     Asthma Father      Hypertension Paternal Grandmother        Social History     Socioeconomic History     Marital status: Single     Spouse name: Not on file     Number of children: Not on file     Years of education: Not on file     Highest education level: Not on file   Occupational History     Not on file   Social Needs     Financial resource strain: Not on file     Food insecurity:     Worry: Not on file     Inability: Not on file     Transportation needs:     Medical: Not on file     Non-medical: Not on file   Tobacco Use     Smoking status: Never Smoker     Smokeless tobacco: Never Used   Substance and Sexual Activity     Alcohol use: Yes     Comment: occas     Drug use: No     Sexual activity: Yes     Partners: Male     Birth control/protection: Condom   Lifestyle     Physical activity:     Days per week: Not on file     Minutes per session: Not on file     Stress: Not on file   Relationships     Social connections:     Talks on phone: Not on file     Gets together: Not on file     Attends Gnosticism service: Not on file     Active member of club or organization: Not on file     Attends meetings of clubs or  organizations: Not on file     Relationship status: Not on file     Intimate partner violence:     Fear of current or ex partner: Not on file     Emotionally abused: Not on file     Physically abused: Not on file     Forced sexual activity: Not on file   Other Topics Concern     Parent/sibling w/ CABG, MI or angioplasty before 65F 55M? Not Asked   Social History Narrative     Not on file       Outpatient Encounter Medications as of 2019   Medication Sig Dispense Refill     metFORMIN (GLUCOPHAGE-XR) 500 MG 24 hr tablet Take 1 tablet (500 mg) by mouth daily (with dinner) 60 tablet 2     paragard intrauterine copper device 1 each by Intrauterine route continuous       [] azithromycin (ZITHROMAX) 500 MG tablet Take 1 tablet (500 mg) by mouth daily for 3 doses Take 1 tablet a day for up to 3 days for severe diarrhea 3 tablet 0     fluticasone (FLONASE) 50 MCG/ACT spray Spray 1-2 sprays into both nostrils daily (Patient not taking: Reported on 2018) 16 g 1     No facility-administered encounter medications on file as of 2019.              Review Of Systems  Skin: As above  Eyes: negative  Ears/Nose/Throat: negative  Respiratory: No shortness of breath, dyspnea on exertion, cough, or hemoptysis  Cardiovascular: negative  Gastrointestinal: negative  Genitourinary: negative  Musculoskeletal: negative  Neurologic: negative  Psychiatric: negative  Hematologic/Lymphatic/Immunologic: negative  Endocrine: negative      O:   NAD, WDWN, Alert & Oriented, Mood & Affect wnl, Vitals stable   Here today alone   /89   Pulse 80   SpO2 97%    General appearance normal   Vitals stable   Alert, oriented and in no acute distress     Verrucous papule on right foot       Eyes: Conjunctivae/lids:Normal     ENT: Lips: normal    MSK:Normal    Cardiovascular: peripheral edema none    Pulm: Breathing Normal    Neuro/Psych: Orientation:Alert and Orientedx3 ; Mood/Affect:normal   A/P:  1. Plantar wart on foot   IL  Candin: PGACAC discussed.  Risks including but not limited to injection site reaction, bruising, no resolution.  All questions answered and entertained to patient s satisfaction.  Informed consent obtained.  IL Candin in concentration of 1 unit/ 0.1 ml was injected ID to right plantar wart.  Total injected was  1 units.  Patient tolerated without complications and given wound care instructions, including not to move product around.  Return in 4 weeks for follow-up and possible additional IL Candin.    CANTHARIDIN (CANTHARONE) TREATMENT  FOR  warts  Cantharone/Cantharidin is a blistering solution which causes  redness, swelling, and fluid accumulation under the wart  12-24 hours after treatment the area(s) should be washed carefully  with soap and water. DO NOT SCRUB the area(s) there should  be a film over the treated area(s) after washing  If severer stinging or burning occurs before the 4 hours it is ok  to wash the area sooner - Again DO NOT SCRUB the area(s)  there should be a film over the treated area(s)  Blistering with start to form in about 3 to 8 hours post  treatment  Some patient may be very sensitive to the Cantharone and may  experience tingling or burning sensation at the treatment site(s)  Tylenol/Advil may be taken for discomfort.  In 1-2 weeks crusting and peeling occurs- Vaseline may be  applied (using a Q-tip) to help the healing process.  If the area(s) become very red, painful to touch, and/or looks  infected contact the office to speak to your  Provider     Multiple treatments may be needed to treat warts.    Again, thank you for allowing me to participate in the care of your patient.        Sincerely,        Candace Vega PA-C

## 2020-05-07 ENCOUNTER — TELEPHONE (OUTPATIENT)
Dept: FAMILY MEDICINE | Facility: CLINIC | Age: 25
End: 2020-05-07

## 2020-05-07 DIAGNOSIS — E28.2 PCOS (POLYCYSTIC OVARIAN SYNDROME): ICD-10-CM

## 2020-05-07 RX ORDER — METFORMIN HCL 500 MG
TABLET, EXTENDED RELEASE 24 HR ORAL
Qty: 30 TABLET | Refills: 0 | Status: SHIPPED | OUTPATIENT
Start: 2020-05-07 | End: 2023-05-20

## 2020-05-07 NOTE — TELEPHONE ENCOUNTER
"Requested Prescriptions   Pending Prescriptions Disp Refills     metFORMIN (GLUCOPHAGE-XR) 500 MG 24 hr tablet [Pharmacy Med Name: METFORMIN HCL  MG TABLET] 90 tablet 1     Sig: TAKE 1 TABLET BY MOUTH EVERY DAY WITH DINNER     Last office visit: 11/8/2020 with prescribing provider:  Jensen   Future Office Visit:          Biguanide Agents Passed - 5/7/2020 12:16 AM        Passed - Patient is age 10 or older        Passed - Recent (12 mo) or future (30 days) visit within the authorizing provider's specialty      Patient has had an office visit with the authorizing provider or a provider within the authorizing providers department within the previous 12 mos or has a future within next 30 days. See \"Patient Info\" tab in inbasket, or \"Choose Columns\" in Meds & Orders section of the refill encounter.              Passed - Patient does NOT have a diagnosis of CHF.        Passed - Medication is active on med list        Passed - Patient is not pregnant        Passed - Patient has not had a positive pregnancy test within the past 12 mos.              Routing refill request to provider for review/approval because:  Patient was to follow up in February for labs and med recheck.  Paradise Alexandra RN      "

## 2020-05-11 NOTE — TELEPHONE ENCOUNTER
Please call patient, due for recheck for further refills, please assist patient in scheduling virtual appt.  [place] refill given.       Neela Styles PA-C

## 2020-06-08 ENCOUNTER — OFFICE VISIT (OUTPATIENT)
Dept: URGENT CARE | Facility: URGENT CARE | Age: 25
End: 2020-06-08
Payer: COMMERCIAL

## 2020-06-08 VITALS
BODY MASS INDEX: 24.49 KG/M2 | TEMPERATURE: 99.9 F | DIASTOLIC BLOOD PRESSURE: 103 MMHG | SYSTOLIC BLOOD PRESSURE: 156 MMHG | WEIGHT: 155.2 LBS | HEART RATE: 94 BPM | OXYGEN SATURATION: 96 %

## 2020-06-08 DIAGNOSIS — T19.2XXA RETAINED TAMPON, INITIAL ENCOUNTER: Primary | ICD-10-CM
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
## 2020-06-08 PROCEDURE — 99213 OFFICE O/P EST LOW 20 MIN: CPT | Performed by: FAMILY MEDICINE

## 2020-06-08 ASSESSMENT — ENCOUNTER SYMPTOMS
VOMITING: 0
NAUSEA: 0
SHORTNESS OF BREATH: 0
COUGH: 0
SORE THROAT: 0
HEADACHES: 0
RHINORRHEA: 0
FEVER: 0
CHILLS: 0
DIARRHEA: 0

## 2020-06-08 NOTE — PROGRESS NOTES
SUBJECTIVE:   Tammi Barry is a 25 year old female presenting with a chief complaint of   Chief Complaint   Patient presents with     Vaginal Problem     Found old tampon in vagina       25-year-old female presenting today concerned about a retained tampon.  Apparently she noticed a tampon earlier today was not sure how long she had been wearing this.  She could 1 day 3 of her menstrual cycle and using 4 and 5 tampons a day to the first couple days of her cycle.  Denies any ongoing stomach pain.  Denies history of toxic shock.  Denies history of previous retained tampon.  Denies vaginal discharge denies concern for STD, denies urinary tract symptoms.      Monogamous with boyfriend       Ovarian cyst rupture -- 1.5 years   PCOS  IUD over the past 5 years             Review of Systems   Constitutional: Negative for chills and fever.   HENT: Negative for congestion, ear pain, rhinorrhea and sore throat.    Respiratory: Negative for cough and shortness of breath.    Gastrointestinal: Negative for diarrhea, nausea and vomiting.   Neurological: Negative for headaches.       History reviewed. No pertinent past medical history.  Family History   Problem Relation Age of Onset     Pituitary Disorder Mother         adenoma     Asthma Father      Hypertension Paternal Grandmother      Current Outpatient Medications   Medication Sig Dispense Refill     paragard intrauterine copper device 1 each by Intrauterine route continuous       fluticasone (FLONASE) 50 MCG/ACT spray Spray 1-2 sprays into both nostrils daily (Patient not taking: Reported on 2/9/2018) 16 g 1     metFORMIN (GLUCOPHAGE-XR) 500 MG 24 hr tablet TAKE 1 TABLET BY MOUTH EVERY DAY WITH DINNER (Patient not taking: Reported on 6/8/2020) 30 tablet 0     Social History     Tobacco Use     Smoking status: Never Smoker     Smokeless tobacco: Never Used   Substance Use Topics     Alcohol use: Yes     Comment: occas       OBJECTIVE  BP (!) 156/103   Pulse 94   Temp 99.9   F (37.7  C) (Tympanic)   Wt 70.4 kg (155 lb 3.2 oz)   SpO2 96%   BMI 24.49 kg/m      Physical Exam  Cardiovascular:      Rate and Rhythm: Normal rate.      Pulses: Normal pulses.   Genitourinary:     General: Normal vulva.      Comments: Normal-appearing spec exam notable for vaginal bleeding due to menstruation.  Was able to clear away vaginal bleeding with 2 large swabs able to visualize the IUD string and cervical os.  Appears normal. no evidence of retained tampon.  Skin:     Capillary Refill: Capillary refill takes less than 2 seconds.   Neurological:      General: No focal deficit present.      Mental Status: She is alert.   Psychiatric:         Mood and Affect: Mood normal.         ASSESSMENT:    ICD-10-CM    1. Retained tampon, initial encounter  T19.2XXA         PLAN:  I did provide her reassurance I did mention rare instance there is been associated toxic shock with prolonged tampon use feel this is highly unlikely.  Although she has had a minimal low-grade fever of 99.9 she other wise feels well.  I did emphasize if she becomes lightheaded, increasingly feverish, develops abdominal pain or rash she should return to clinic immediately for further evaluation.  Once again I feel is highly unlikely in this instance  Patient educational/instructional material provided including reasons for follow-up   The patient indicates understanding of these issues and agrees with the plan.   Nabil Matthews MD

## 2020-08-12 ENCOUNTER — VIRTUAL VISIT (OUTPATIENT)
Dept: FAMILY MEDICINE | Facility: OTHER | Age: 25
End: 2020-08-12

## 2020-08-12 DIAGNOSIS — Z20.822 SUSPECTED 2019 NOVEL CORONAVIRUS INFECTION: Primary | ICD-10-CM

## 2020-08-12 NOTE — PROGRESS NOTES
"Date: 2020 13:15:55  Clinician: Kimmy Welch  Clinician NPI: [id number]  Patient: Tammi Barry  Patient : 1995  Patient Address: [address] [place] [address]  Patient Phone: [phone]  Visit Protocol: URI  Patient Summary:  Tammi is a 25 year old ( : 1995 ) female who initiated a Visit for COVID-19 (Coronavirus) evaluation and screening. When asked the question \"Please sign me up to receive news, health information and promotions. \", Tammi responded \"No\".    When asked when her symptoms started, Tammi reported that she does not have any symptoms.   She denies taking antibiotic medication in the past month and having recent facial or sinus surgery in the past 60 days.    Pertinent COVID-19 (Coronavirus) information  In the past 14 days, Tammi has not worked in a congregate living setting.   She does not work or volunteer as healthcare worker or a  and does not work or volunteer in a healthcare facility.   Tammi also has not lived in a congregate living setting in the past 14 days. She does not live with a healthcare worker.   Tammi has had a close contact with a laboratory-confirmed COVID-19 patient in the last 14 days. She was exposed at her work. Additional information about contact with COVID-19 (Coronavirus) patient as reported by the patient (free text): At work all day Friday [id number]. 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    Patient reported they are not living in the same household with a COVID-19 positive patient.  Patient was in an enclosed space for greater than 15 minutes with a COVID-19 patient.  Since 2019, Tammi and has had upper respiratory infection (URI) or influenza-like illness. Has not been diagnosed with lab-confirmed COVID-19 test      Date(s) of previous URI or influenza-like illness (free-text): -2020     Symptoms Tammi experienced during previous URI or influenza-like illness as " reported by the patient (free-text): fever, fatique, light headed, temperature sensitivity, nausea        Pertinent medical history  Tammi does not get yeast infections when she takes antibiotics.   Tammi needs a return to work/school note.   Weight: 150 lbs   Tammi does not smoke or use smokeless tobacco.   She denies pregnancy and denies breastfeeding. She has menstruated in the past month.   Weight: 150 lbs    MEDICATIONS: No current medications, ALLERGIES: NKDA  Clinician Response:  Dear Tammi,   Based on your exposure to COVID-19 (coronavirus), we would like to test you for this virus.  1. Please call [phone] to schedule your visit. Explain that you were referred by [facility] to have a COVID-19 test. Be ready [facility].  The following will serve as your written order for this COVID Test, ordered by me, for the indication of suspected COVID [Z20.828]: The test will be ordered in [facility], our electronic health record, after you are scheduled. It will show as ordered and authorized by Phani Kelsey MD.  Order: COVID-19 (coronavirus) PCR for ASYMPTOMATIC EXPOSURE testing from [facility].  If you know you have had close contact with someone who tested positive, you should be quarantined for 14 days after this exposure. You should stay in quarantine for the14 days even if the covid test is negative, the optimal time to test after exposure is 5-7 days from the exposure  Quarantine means   What should I do?  For safety, it's very important to follow these rules. Do this for 14 days after the date you were last exposed to the virus..  Stay home and away from others. Don't go to school or anywhere else. Generally quarantine means staying home from work but there are some exceptions to this. Please contact your workplace.   No hugging, kissing or shaking hands.  Don't let anyone visit.  Cover your mouth and nose with a mask, tissue or washcloth to avoid spreading germs.  Wash your hands and face  often. Use soap and water.  What are the symptoms of COVID-19?  The most common symptoms are cough, fever and trouble breathing. Less common symptoms include headache, body aches, fatigue (feeling very tired), chills, sore throat, stuffy or runny nose, diarrhea (loose poop), loss of taste or smell, belly pain, and nausea or vomiting (feeling sick to your stomach or throwing up).  After 14 days, if you have still don't have symptoms, you likely don't have this virus.  If you develop symptoms, follow these guidelines.  If you're normally healthy: Please start another [facility] visit to report your symptoms. Go to [email or web address].  If you have a serious health problem (like cancer, heart failure, an organ transplant or kidney disease): Call your specialty clinic. Let them know that you might have COVID-19.  2. When it's time for your COVID test:  Stay at least 6 feet away from others. (If someone will drive you to your test, stay in the backseat, as far away from the  as you can.)  Cover your mouth and nose with a mask, tissue or washcloth.  Go straight to the testing site. Don't make any stops on the way there or back.  Please note  Caregivers in these groups are at risk for severe illness due to COVID-19:  o People 65 years and older  o People who live in a nursing home or long-term care facility  o People with chronic disease (lung, heart, cancer, diabetes, kidney, liver, immunologic)  o People who have a weakened immune system, including those who:  Are in cancer treatment  Take medicine that weakens the immune system, such as corticosteroids  Had a bone marrow or organ transplant  Have an immune deficiency  Have poorly controlled HIV or AIDS  Are obese (body mass index of 40 or higher)  Smoke regularly  Where can I get more information?   [facility] -- About COVID-19: [email or web address]  CDC -- What to Do If You're Sick: www.cdc.gov/coronavirus/2019-ncov/about/steps-when-sick.html  CDC -- Ending  Home Isolation: www.cdc.gov/coronavirus/2019-ncov/hcp/disposition-in-home-patients.html  CDC -- Caring for Someone: www.cdc.gov/coronavirus/2019-ncov/if-you-are-sick/care-for-someone.html  [facility] -- Interim Guidance for Hospital Discharge to Home: www.[facility].[place]./diseases/coronavirus/hcp/hospdischarge.pdf  [facility] clinical trials (COVID-19 research studies): clinicalaffairs.[facility]/n-clinical-trials  Below are the COVID-19 hotlines at the [place] Department [facility] ([facility]). Interpreters are available.  For health questions: Call 031-050-9629 or 1-150.125.2991 (7 a.m. to 7 p.m.)  For questions about schools and childcare: Call 734-128-4266 or 1-449.779.8449 (7 a.m. to 7 p.m.)    Diagnosis: Contact with and (suspected) exposure to other viral communicable diseases  Diagnosis ICD: Z20.828

## 2020-11-16 ENCOUNTER — HEALTH MAINTENANCE LETTER (OUTPATIENT)
Age: 25
End: 2020-11-16

## 2021-01-15 ENCOUNTER — HEALTH MAINTENANCE LETTER (OUTPATIENT)
Age: 26
End: 2021-01-15

## 2021-04-15 ENCOUNTER — IMMUNIZATION (OUTPATIENT)
Dept: FAMILY MEDICINE | Facility: CLINIC | Age: 26
End: 2021-04-15
Payer: COMMERCIAL

## 2021-04-15 PROCEDURE — 0011A PR COVID VAC MODERNA 100 MCG/0.5 ML IM: CPT

## 2021-04-15 PROCEDURE — 91301 PR COVID VAC MODERNA 100 MCG/0.5 ML IM: CPT

## 2021-05-13 ENCOUNTER — IMMUNIZATION (OUTPATIENT)
Dept: FAMILY MEDICINE | Facility: CLINIC | Age: 26
End: 2021-05-13
Attending: FAMILY MEDICINE
Payer: OTHER GOVERNMENT

## 2021-05-13 PROCEDURE — 0012A PR COVID VAC MODERNA 100 MCG/0.5 ML IM: CPT

## 2021-05-13 PROCEDURE — 91301 PR COVID VAC MODERNA 100 MCG/0.5 ML IM: CPT

## 2021-09-18 ENCOUNTER — HEALTH MAINTENANCE LETTER (OUTPATIENT)
Age: 26
End: 2021-09-18

## 2022-02-27 ENCOUNTER — HEALTH MAINTENANCE LETTER (OUTPATIENT)
Age: 27
End: 2022-02-27

## 2022-11-19 ENCOUNTER — HEALTH MAINTENANCE LETTER (OUTPATIENT)
Age: 27
End: 2022-11-19

## 2023-04-09 ENCOUNTER — HEALTH MAINTENANCE LETTER (OUTPATIENT)
Age: 28
End: 2023-04-09

## 2023-05-19 ENCOUNTER — OFFICE VISIT (OUTPATIENT)
Dept: FAMILY MEDICINE | Facility: CLINIC | Age: 28
End: 2023-05-19
Payer: COMMERCIAL

## 2023-05-19 VITALS
WEIGHT: 194 LBS | BODY MASS INDEX: 30.45 KG/M2 | HEART RATE: 80 BPM | RESPIRATION RATE: 16 BRPM | SYSTOLIC BLOOD PRESSURE: 120 MMHG | DIASTOLIC BLOOD PRESSURE: 80 MMHG | OXYGEN SATURATION: 98 % | HEIGHT: 67 IN | TEMPERATURE: 97.6 F

## 2023-05-19 DIAGNOSIS — Z11.3 SCREEN FOR STD (SEXUALLY TRANSMITTED DISEASE): ICD-10-CM

## 2023-05-19 DIAGNOSIS — E66.811 CLASS 1 OBESITY: ICD-10-CM

## 2023-05-19 DIAGNOSIS — Z12.4 CERVICAL CANCER SCREENING: ICD-10-CM

## 2023-05-19 DIAGNOSIS — Z00.00 HEALTHCARE MAINTENANCE: Primary | ICD-10-CM

## 2023-05-19 DIAGNOSIS — N89.8 VAGINAL DISCHARGE: ICD-10-CM

## 2023-05-19 DIAGNOSIS — E28.2 PCOS (POLYCYSTIC OVARIAN SYNDROME): ICD-10-CM

## 2023-05-19 DIAGNOSIS — Z97.5 IUD (INTRAUTERINE DEVICE) IN PLACE: ICD-10-CM

## 2023-05-19 LAB
ALBUMIN SERPL BCG-MCNC: 4.8 G/DL (ref 3.5–5.2)
ALP SERPL-CCNC: 87 U/L (ref 35–104)
ALT SERPL W P-5'-P-CCNC: 63 U/L (ref 10–35)
ANION GAP SERPL CALCULATED.3IONS-SCNC: 14 MMOL/L (ref 7–15)
APO A-I SERPL-MCNC: 11 MG/DL
AST SERPL W P-5'-P-CCNC: 38 U/L (ref 10–35)
BILIRUB SERPL-MCNC: 0.6 MG/DL
BUN SERPL-MCNC: 11.6 MG/DL (ref 6–20)
CALCIUM SERPL-MCNC: 9.9 MG/DL (ref 8.6–10)
CHLORIDE SERPL-SCNC: 103 MMOL/L (ref 98–107)
CLUE CELLS: ABNORMAL
CREAT SERPL-MCNC: 0.68 MG/DL (ref 0.51–0.95)
DEPRECATED HCO3 PLAS-SCNC: 23 MMOL/L (ref 22–29)
GFR SERPL CREATININE-BSD FRML MDRD: >90 ML/MIN/1.73M2
GLUCOSE SERPL-MCNC: 102 MG/DL (ref 70–99)
HBA1C MFR BLD: 5.7 % (ref 0–5.6)
INSULIN SERPL-ACNC: 15.6 UU/ML (ref 2.6–24.9)
POTASSIUM SERPL-SCNC: 4.3 MMOL/L (ref 3.4–5.3)
PROLACTIN SERPL 3RD IS-MCNC: 19 NG/ML (ref 5–23)
PROT SERPL-MCNC: 8 G/DL (ref 6.4–8.3)
SODIUM SERPL-SCNC: 140 MMOL/L (ref 136–145)
TRICHOMONAS, WET PREP: ABNORMAL
WBC'S/HIGH POWER FIELD, WET PREP: ABNORMAL
YEAST, WET PREP: ABNORMAL

## 2023-05-19 PROCEDURE — 87591 N.GONORRHOEAE DNA AMP PROB: CPT | Performed by: NURSE PRACTITIONER

## 2023-05-19 PROCEDURE — 93000 ELECTROCARDIOGRAM COMPLETE: CPT | Performed by: NURSE PRACTITIONER

## 2023-05-19 PROCEDURE — 99214 OFFICE O/P EST MOD 30 MIN: CPT | Mod: 25 | Performed by: NURSE PRACTITIONER

## 2023-05-19 PROCEDURE — 87210 SMEAR WET MOUNT SALINE/INK: CPT | Performed by: NURSE PRACTITIONER

## 2023-05-19 PROCEDURE — 87491 CHLMYD TRACH DNA AMP PROBE: CPT | Performed by: NURSE PRACTITIONER

## 2023-05-19 PROCEDURE — 83036 HEMOGLOBIN GLYCOSYLATED A1C: CPT | Performed by: NURSE PRACTITIONER

## 2023-05-19 PROCEDURE — 36415 COLL VENOUS BLD VENIPUNCTURE: CPT | Performed by: NURSE PRACTITIONER

## 2023-05-19 PROCEDURE — 80053 COMPREHEN METABOLIC PANEL: CPT | Performed by: NURSE PRACTITIONER

## 2023-05-19 PROCEDURE — 83525 ASSAY OF INSULIN: CPT | Performed by: NURSE PRACTITIONER

## 2023-05-19 PROCEDURE — 83695 ASSAY OF LIPOPROTEIN(A): CPT | Performed by: NURSE PRACTITIONER

## 2023-05-19 PROCEDURE — 99385 PREV VISIT NEW AGE 18-39: CPT | Performed by: NURSE PRACTITIONER

## 2023-05-19 PROCEDURE — 84146 ASSAY OF PROLACTIN: CPT | Performed by: NURSE PRACTITIONER

## 2023-05-19 RX ORDER — METFORMIN HCL 500 MG
500 TABLET, EXTENDED RELEASE 24 HR ORAL
Qty: 60 TABLET | Refills: 0 | Status: SHIPPED | OUTPATIENT
Start: 2023-05-19 | End: 2023-10-11

## 2023-05-19 ASSESSMENT — ENCOUNTER SYMPTOMS
SHORTNESS OF BREATH: 0
COUGH: 0
BREAST MASS: 0
FREQUENCY: 0
DIARRHEA: 0
HEMATOCHEZIA: 0
PARESTHESIAS: 0
WEAKNESS: 0
PALPITATIONS: 0
DYSURIA: 0
SORE THROAT: 0
EYE PAIN: 0
NAUSEA: 0
MYALGIAS: 0
HEADACHES: 0
FEVER: 0
NERVOUS/ANXIOUS: 0
ARTHRALGIAS: 0
HEMATURIA: 0
CHILLS: 0
ABDOMINAL PAIN: 0
CONSTIPATION: 0
DIZZINESS: 0
HEARTBURN: 0

## 2023-05-19 NOTE — ASSESSMENT & PLAN NOTE
Immunizations: covid and Influenza declines  Declines  Last Pap smear  was negative; due for one today  Gonorrhea/chlamydia testing done today  Pap smear done today.   History of alcohol misuse; used it for coping. Has quit since 2023. Currently not using alcohol.     Family history  Dad:  MI, recovering alcoholic, tobacco  Mom: pituitary tumor  Cousin blood clots  General substance abuse on both sides of family  Paternal grandparents: 90s and healthy  Maternal grandmother: tobacco 1-2, recovering alcoholic 88s

## 2023-05-19 NOTE — ASSESSMENT & PLAN NOTE
Wt Readings from Last 4 Encounters:   05/19/23 88 kg (194 lb)   06/08/20 70.4 kg (155 lb 3.2 oz)   11/08/19 72.6 kg (160 lb)   10/23/19 72.6 kg (160 lb)     Goal: 170s    Nutrition: 70- 130 grams of protein, 50-70 gram of CHO foods per day. Eating a whole foods diet, low carb already  Exercise: HIIT, running and cardio 5-6 times per week. Consider adding in Weight training  Medications; discussed various options, will start with Phentermine because it will help her with her short term goals of losing some weight before a wedding she has this summer. Discussed side effects, risks, MOA. Later can consider Wegovy.

## 2023-05-19 NOTE — PROGRESS NOTES
SUBJECTIVE:   CC: Tammi is an 28 year old who presents for preventive health visit.        View : No data to display.              Patient has been advised of split billing requirements and indicates understanding: Yes  Had an increased prolactin in the past would like it rechecked. Mom has a pituitary tumor. IUD,  Inconsistent period ParaGard, spotting for 1 month.   Diagnosed with PCOS    Weight gain: 1 1/2 years ago; her dad  and cat , depression, stopped drinking in February, exercise 5-6 days a week. 2.5 months, trying to lose weight.     Nutrition: Mediterranean diet, low carb, low sugar, low salt, lots of veggies, fish and chicken. Occasional nighttime snack. 2 meals and a treat (creampuff) breakfast: oatmeal pack with hard boiled egg, sometimes skip breakfast, eggs and veggies.   Exercise/movement:  Sleep: better since stopped drinking, mild anxiety 7-8 hours, night person 11:30-8 am  Stress: grief, [place], broke up with a relationship  Alcohol: quit since Feb. Drinking for coping mechanism  Tobacco: no  Drugs: THC radhatzmerrill    Family history  Dad:  MI, recovering alcoholic, tobacco  Mom: pituitary tumor  Cousin blood clots  General substance abuse on both sides of family  Paternal grandparents: 90s and healthy  Maternal grandmother: tobacco 1-2, recovering alcoholic 88s    Healthy Habits:     Getting at least 3 servings of Calcium per day:  Yes    Bi-annual eye exam:  NO    Dental care twice a year:  NO    Sleep apnea or symptoms of sleep apnea:  Daytime drowsiness and Excessive snoring    Diet:  Low salt and Carbohydrate counting    Frequency of exercise:  4-5 days/week    Duration of exercise:  30-45 minutes    Taking medications regularly:  Yes    Barriers to taking medications:  None    Medication side effects:  None    PHQ-2 Total Score: 0    Additional concerns today:  Yes    Labs and recheck her prolactin    Today's PHQ-2 Score:       2023     8:20 AM   PHQ-2 (   [employer])   Q1: Little interest or pleasure in doing things 0   Q2: Feeling down, depressed or hopeless 0   PHQ-2 Score 0   Q1: Little interest or pleasure in doing things Not at all   Q2: Feeling down, depressed or hopeless Not at all   PHQ-2 Score 0       Have you ever done Advance Care Planning? (For example, a Health Directive, POLST, or a discussion with a medical provider or your loved ones about your wishes): No, advance care planning information given to patient to review.  Patient plans to discuss their wishes with loved ones or provider.      Social History     Tobacco Use     Smoking status: Never     Smokeless tobacco: Never   Vaping Use     Vaping status: Not on file   Substance Use Topics     Alcohol use: Yes     Comment: occas             5/19/2023     8:19 AM   Alcohol Use   Prescreen: >3 drinks/day or >7 drinks/week? Yes   AUDIT SCORE  16         5/19/2023     8:19 AM   AUDIT - Alcohol Use Disorders Identification Test - Reproduced from the World Health Organization Audit 2001 (Second Edition)   1.  How often do you have a drink containing alcohol? 2 to 3 times a week   2.  How many drinks containing alcohol do you have on a typical day when you are drinking? 3 or 4   3.  How often do you have five or more drinks on one occasion? Monthly   4.  How often during the last year have you found that you were not able to stop drinking once you had started? Less than monthly   5.  How often during the last year have you failed to do what was normally expected of you because of drinking? Less than monthly   6.  How often during the last year have you needed a first drink in the morning to get yourself going after a heavy drinking session? Less than monthly   7.  How often during the last year have you had a feeling of guilt or remorse after drinking? Monthly   8.  How often during the last year have you been unable to remember what happened the night before because of your drinking? Less than monthly   9.   Have you or someone else been injured because of your drinking? No   10. Has a relative, friend, doctor or other health care worker been concerned about your drinking or suggested you cut down? Yes, during the last year   TOTAL SCORE 16     Reviewed orders with patient.  Reviewed health maintenance and updated orders accordingly - Yes  Lab work is in process    Breast Cancer Screenin/19/2023     8:20 AM   Breast CA Risk Assessment (FHS-7)   Do you have a family history of breast, colon, or ovarian cancer? No / Unknown     Pertinent mammograms are reviewed under the imaging tab.    History of abnormal Pap smear: NO - age 21-29 PAP every 3 years recommended      10/23/2019     7:55 AM 2016    12:00 AM   PAP / HPV   PAP (Historical) NIL   NIL       Reviewed and updated as needed this visit by clinical staff    Allergies  Meds              Reviewed and updated as needed this visit by Provider                 No past medical history on file.   Past Surgical History:   Procedure Laterality Date     ELBOW SURGERY      titanium pin in place       Review of Systems   Constitutional: Negative for chills and fever.   HENT: Negative for congestion, ear pain, hearing loss and sore throat.    Eyes: Negative for pain and visual disturbance.   Respiratory: Negative for cough and shortness of breath.    Cardiovascular: Negative for chest pain, palpitations and peripheral edema.   Gastrointestinal: Negative for abdominal pain, constipation, diarrhea, heartburn, hematochezia and nausea.   Breasts:  Negative for tenderness, breast mass and discharge.   Genitourinary: Negative for dysuria, frequency, genital sores, hematuria, pelvic pain, urgency, vaginal bleeding and vaginal discharge.   Musculoskeletal: Negative for arthralgias and myalgias.   Skin: Negative for rash.   Neurological: Negative for dizziness, weakness, headaches and paresthesias.   Psychiatric/Behavioral: Negative for mood changes. The patient is  "not nervous/anxious.         OBJECTIVE:   /80 (BP Location: Right arm, Patient Position: Sitting, Cuff Size: Adult Large)   Pulse 80   Temp 97.6  F (36.4  C) (Tympanic)   Resp 16   Ht 1.702 m (5' 7\")   Wt 88 kg (194 lb)   LMP 04/27/2023 (Approximate)   SpO2 98%   BMI 30.38 kg/m    Physical Exam  Constitutional:       Appearance: Normal appearance.   HENT:      Head: Normocephalic.      Right Ear: Tympanic membrane, ear canal and external ear normal.      Left Ear: Tympanic membrane, ear canal and external ear normal.      Nose: Nose normal.      Mouth/Throat:      Mouth: Mucous membranes are moist.      Pharynx: Oropharynx is clear.   Eyes:      Extraocular Movements: Extraocular movements intact.      Conjunctiva/sclera: Conjunctivae normal.      Pupils: Pupils are equal, round, and reactive to light.   Neck:      Thyroid: No thyroid mass, thyromegaly or thyroid tenderness.      Trachea: Trachea normal.   Cardiovascular:      Rate and Rhythm: Normal rate and regular rhythm.      Heart sounds: Normal heart sounds.   Pulmonary:      Effort: Pulmonary effort is normal.      Breath sounds: Normal breath sounds.   Abdominal:      General: Bowel sounds are normal.      Palpations: Abdomen is soft.   Genitourinary:     General: Normal vulva.      Vagina: Normal.      Cervix: Normal.      Uterus: Normal.       Adnexa: Right adnexa normal and left adnexa normal.      Rectum: Normal.   Musculoskeletal:         General: Normal range of motion.      Cervical back: Normal range of motion.   Lymphadenopathy:      Cervical: No cervical adenopathy.   Skin:     General: Skin is warm and dry.   Neurological:      Mental Status: She is alert and oriented to person, place, and time.      Cranial Nerves: Cranial nerves 2-12 are intact.      Motor: Motor function is intact.      Coordination: Coordination is intact.      Gait: Gait is intact.      Deep Tendon Reflexes: Reflexes are normal and symmetric.   Psychiatric:    "      Mood and Affect: Mood normal.         Behavior: Behavior normal.         Thought Content: Thought content normal.         Judgment: Judgment normal.         ASSESSMENT/PLAN:     Problem List Items Addressed This Visit        Endocrine    PCOS (polycystic ovarian syndrome)     Meets Rottardam criteria;  Comorbid conditions insulin resistance. Did well with Metformin the past; was able to keep her weight down.   Start metformin again 500 mg prescription sent consider going up on dosing if needed. Monitor for side effects,            Relevant Medications    metFORMIN (GLUCOPHAGE XR) 500 MG 24 hr tablet    Other Relevant Orders    Prolactin (Completed)       Urinary    IUD (intrauterine device) in place     8 years of the ParaGuard. Some breakthrough bleeding. Didn't like the hormonal birth controls that she used in the past. Caused more side effects.             Other    Healthcare maintenance - Primary     Immunizations: covid and Influenza declines  Declines  Last Pap smear  was negative; due for one today  Gonorrhea/chlamydia testing done today  Pap smear done today.   History of alcohol misuse; used it for coping. Has quit since 2023. Currently not using alcohol.     Family history  Dad:  MI, recovering alcoholic, tobacco  Mom: pituitary tumor  Cousin blood clots  General substance abuse on both sides of family  Paternal grandparents: 90s and healthy  Maternal grandmother: tobacco 1-2, recovering alcoholic 88s         Class 1 obesity     Wt Readings from Last 4 Encounters:   23 88 kg (194 lb)   20 70.4 kg (155 lb 3.2 oz)   19 72.6 kg (160 lb)   10/23/19 72.6 kg (160 lb)     Goal: 170s    Nutrition: 70- 130 grams of protein, 50-70 gram of CHO foods per day. Eating a whole foods diet, low carb already  Exercise: HIIT, running and cardio 5-6 times per week. Consider adding in Weight training  Medications; discussed various options, will start with Phentermine because it will help her  with her short term goals of losing some weight before a wedding she has this summer. Discussed side effects, risks, MOA. Later can consider Wegovy.          Relevant Orders    Prolactin (Completed)    Lipoprotein (a) (Completed)    Hemoglobin A1c (Completed)    Insulin level (Completed)    Comprehensive metabolic panel (BMP + Alb, Alk Phos, ALT, AST, Total. Bili, TP) (Completed)    EKG 12-lead complete w/read - Clinics (Completed)   Other Visit Diagnoses     Vaginal discharge        Relevant Orders    Wet prep - Clinic Collect (Completed)    Cervical cancer screening        Relevant Orders    Pap Screen reflex to HPV if ASCUS - recommend age 25 - 29    Screen for STD (sexually transmitted disease)        Relevant Orders    NEISSERIA GONORRHOEA PCR    CHLAMYDIA TRACHOMATIS PCR    Chlamydia trachomatis PCR (Completed)    Neisseria gonorrhoeae PCR (Completed)          Patient has been advised of split billing requirements and indicates understanding: Yes      COUNSELING:  Counseled on healthy diet, exercise physical activity, stress management, alcohol usage, and sleep.          She reports that she has never smoked. She has never used smokeless tobacco.    JERICHO Dykes CNP  [facility]

## 2023-05-20 DIAGNOSIS — E66.811 CLASS 1 OBESITY: Primary | ICD-10-CM

## 2023-05-20 LAB
C TRACH DNA SPEC QL NAA+PROBE: NEGATIVE
N GONORRHOEA DNA SPEC QL NAA+PROBE: NEGATIVE

## 2023-05-20 RX ORDER — PHENTERMINE HYDROCHLORIDE 15 MG/1
15 CAPSULE ORAL EVERY MORNING
Qty: 30 CAPSULE | Refills: 0 | Status: SHIPPED | OUTPATIENT
Start: 2023-05-20 | End: 2023-06-19

## 2023-05-20 NOTE — PROGRESS NOTES
EKG is normal sinus rhythm with a QT interval of under 400. Safe to start Phentermine. Phentermine has been prescribed and sent to pharmacy.

## 2023-05-21 NOTE — ASSESSMENT & PLAN NOTE
Meets Rottardam criteria;  Comorbid conditions insulin resistance. Did well with Metformin the past; was able to keep her weight down.   Start metformin again 500 mg prescription sent consider going up on dosing if needed. Monitor for side effects,

## 2023-05-21 NOTE — ASSESSMENT & PLAN NOTE
8 years of the ParaGuard. Some breakthrough bleeding. Didn't like the hormonal birth controls that she used in the past. Caused more side effects.

## 2023-05-22 ENCOUNTER — OFFICE VISIT (OUTPATIENT)
Dept: FAMILY MEDICINE | Facility: CLINIC | Age: 28
End: 2023-05-22
Payer: COMMERCIAL

## 2023-05-22 VITALS — BODY MASS INDEX: 30.45 KG/M2 | WEIGHT: 194 LBS | HEIGHT: 67 IN

## 2023-05-22 DIAGNOSIS — Z12.4 CERVICAL CANCER SCREENING: Primary | ICD-10-CM

## 2023-05-22 PROCEDURE — 99207 PR NO CHARGE LOS: CPT | Performed by: NURSE PRACTITIONER

## 2023-05-22 PROCEDURE — G0145 SCR C/V CYTO,THINLAYER,RESCR: HCPCS | Performed by: NURSE PRACTITIONER

## 2023-05-22 NOTE — PROGRESS NOTES
"    Assessment & Plan     Cervical cancer screening  - Pap screen reflex to HPV if ASCUS - recommend age 25 - 29; Future  - Pap screen reflex to HPV if ASCUS - recommend age 25 - 29         BMI:   Estimated body mass index is 30.38 kg/m  as calculated from the following:    Height as of this encounter: 1.702 m (5' 7\").    Weight as of this encounter: 88 kg (194 lb).       FUTURE APPOINTMENTS:       - Follow-up for annual visit or as needed    JERICHO Dykes CNP [facility] is a 28 year old, presenting for the following health issues:  Gyn Exam         View : No data to display.              Patient is here for a repeat Pap smear testing. The first pap smear that was done last week; was mislabeled. So a repeat testing was done today.         History of Present Illness       Reason for visit:  Pap smear    She eats 2-3 servings of fruits and vegetables daily.She consumes 1 sweetened beverage(s) daily.She exercises with enough effort to increase her heart rate 30 to 60 minutes per day.  She exercises with enough effort to increase her heart rate 5 days per week.   She is taking medications regularly.         Review of Systems   All other systems reviewed and are negative.           Objective    Ht 1.702 m (5' 7\")   Wt 88 kg (194 lb)   LMP 04/27/2023 (Approximate)   BMI 30.38 kg/m    Body mass index is 30.38 kg/m .  Physical Exam  Constitutional:       Appearance: Normal appearance.   HENT:      Head: Normocephalic.   Pulmonary:      Effort: Pulmonary effort is normal. No respiratory distress.   Genitourinary:     Vagina: Normal.      Cervix: Normal.   Skin:     General: Skin is warm and dry.   Neurological:      Mental Status: She is alert and oriented to person, place, and time.   Psychiatric:         Mood and Affect: Mood normal.         Behavior: Behavior normal.         Thought Content: Thought content normal.         Judgment: Judgment normal.        "

## 2023-05-25 LAB
BKR LAB AP GYN ADEQUACY: NORMAL
BKR LAB AP GYN INTERPRETATION: NORMAL
BKR LAB AP HPV REFLEX: NORMAL
BKR LAB AP PREVIOUS ABNORMAL: NORMAL
PATH REPORT.COMMENTS IMP SPEC: NORMAL
PATH REPORT.COMMENTS IMP SPEC: NORMAL
PATH REPORT.RELEVANT HX SPEC: NORMAL

## 2023-06-10 DIAGNOSIS — E28.2 PCOS (POLYCYSTIC OVARIAN SYNDROME): ICD-10-CM

## 2023-06-12 RX ORDER — METFORMIN HCL 500 MG
TABLET, EXTENDED RELEASE 24 HR ORAL
Qty: 30 TABLET | Refills: 1 | OUTPATIENT
Start: 2023-06-12

## 2023-06-16 ENCOUNTER — OFFICE VISIT (OUTPATIENT)
Dept: FAMILY MEDICINE | Facility: CLINIC | Age: 28
End: 2023-06-16
Payer: COMMERCIAL

## 2023-06-16 VITALS
DIASTOLIC BLOOD PRESSURE: 64 MMHG | HEIGHT: 67 IN | OXYGEN SATURATION: 98 % | WEIGHT: 181 LBS | TEMPERATURE: 98.4 F | HEART RATE: 82 BPM | SYSTOLIC BLOOD PRESSURE: 118 MMHG | BODY MASS INDEX: 28.41 KG/M2 | RESPIRATION RATE: 16 BRPM

## 2023-06-16 DIAGNOSIS — E66.811 CLASS 1 OBESITY: Primary | ICD-10-CM

## 2023-06-16 DIAGNOSIS — R74.8 ELEVATED LIVER ENZYMES: ICD-10-CM

## 2023-06-16 DIAGNOSIS — E28.2 PCOS (POLYCYSTIC OVARIAN SYNDROME): ICD-10-CM

## 2023-06-16 PROCEDURE — 99214 OFFICE O/P EST MOD 30 MIN: CPT | Performed by: NURSE PRACTITIONER

## 2023-06-16 RX ORDER — METFORMIN HCL 500 MG
500 TABLET, EXTENDED RELEASE 24 HR ORAL
Qty: 90 TABLET | Refills: 0 | Status: SHIPPED | OUTPATIENT
Start: 2023-06-16 | End: 2023-09-11

## 2023-06-16 RX ORDER — PHENTERMINE HYDROCHLORIDE 15 MG/1
15 CAPSULE ORAL EVERY MORNING
Qty: 70 CAPSULE | Refills: 0 | Status: SHIPPED | OUTPATIENT
Start: 2023-06-16 | End: 2023-08-25

## 2023-06-16 ASSESSMENT — PAIN SCALES - GENERAL: PAINLEVEL: NO PAIN (0)

## 2023-06-16 NOTE — PROGRESS NOTES
Assessment & Plan   Problem List Items Addressed This Visit        Endocrine    PCOS (polycystic ovarian syndrome)    Relevant Medications    metFORMIN (GLUCOPHAGE XR) 500 MG 24 hr tablet       Other    Overweight - Primary        Wt Readings from Last 4 Encounters:   06/16/23 82.1 kg (181 lb)   05/22/23 88 kg (194 lb)   05/19/23 88 kg (194 lb)   06/08/20 70.4 kg (155 lb 3.2 oz)     initial weight was 194 on 5/22/2023 she is down 13 pounds from that, initial visit.  Comorbid conditions include PCOS, elevated liver enzymes.  Phenotype or etiology includes insulin resistance, hyperinsulinemia, perhaps even hypercortisolemia and previously high alcohol intake .  Goal: 170s, her long-term goal is to be down into the 140s.     Nutrition: 70- 130 grams of protein, 50-70 gram of CHO foods per day. Eating a whole foods diet, low carb already  Exercise: HIIT, running and cardio 5-6 times per week. Consider adding in Weight training  Medications; phentermine 15 mg is working well, no side effects.  Heart rate and blood pressure are normal ranges.  She is not having any anxiety or other side effects.  She states that she does have a little difficulty falling asleep at night.  But she feels it is worth it for now to continue.    We discussed that there is no significant issues with taking the medications and the alcohol.  However if she feels that this is a time where she would fall back into heavy use then I would recommend that she avoid it during her social activities.  Counseled on all of the labs that were drawn and how they are showing she is early insulin resistance.  I also feel this is likely related to PCOS.  Focused her interventions on lowering those insulin levels.  Recheck labs at the 3-month interval.    Follow-up in early August.         Relevant Medications    phentermine (ADIPEX-P) 15 MG capsule    Other Relevant Orders    Insulin level    Hemoglobin A1c    Elevated liver enzymes     AST 38  ALT  "63    Elevated unclear as to cause as she has been abstaining from alcohol since February. Likely NAFLD or infectious cause Discussed avoidance of foods that contain fructose, high fructose corn syrup, processed foods andAlcohol.    Recheck labs at the 3 months continue to be elevated then or higher then I would recommend  checking labs for secondary causes and a liver ultrasound.         Relevant Orders    Comprehensive metabolic panel (BMP + Alb, Alk Phos, ALT, AST, Total. Bili, TP)         BMI:   Estimated body mass index is 28.35 kg/m  as calculated from the following:    Height as of this encounter: 1.702 m (5' 7\").    Weight as of this encounter: 82.1 kg (181 lb).   Weight management plan: see above    FUTURE APPOINTMENTS:       - Follow-up visit in 2 months    JERICHO Dykes CNP [facility] is a 28 year old, presenting for the following health issues:  Recheck Medication and Weight Loss         View : No data to display.              History of Present Illness       Reason for visit:  Weight loss followup    She eats 2-3 servings of fruits and vegetables daily.She consumes 0 sweetened beverage(s) daily.She exercises with enough effort to increase her heart rate 20 to 29 minutes per day.  She exercises with enough effort to increase her heart rate 5 days per week.   She is taking medications regularly.      Weight change since last encounter:down 13 lbs , 5/22/2023  Weight change since initial visit: 13 lbs, (5/22/2023).   Goal: short term goal 170s, long term goal 140s  Class: overweight; improving   Challenges: falling asleep, concerns about trying alcohol again for her upcoming wedding weekend party.  She has abstained from alcohol since February and before that she felt that she had an alcohol problem.   Things that went well: Lost 13 pounds, overall feels really great.  Appetite is suppressed.  Continues to eat really healthy  Dietary Adherence: And is " "he is adhering to the dietary restrictions.  Exercise: Regular exercise,.   Sleep: Has been more difficult due to the phentermine  Stress: overall low  Meeting goal improving.  Medication use: Phentermine 15 mg daily, medication subjective efficacy: working well supresses appetite. Medication tolerance and side effects: difficulty sleeping.   Comorbid conditions include: PCOS     Review of Systems         Objective    /64 (BP Location: Right arm, Patient Position: Sitting, Cuff Size: Adult Large)   Pulse 82   Temp 98.4  F (36.9  C) (Tympanic)   Resp 16   Ht 1.702 m (5' 7\")   Wt 82.1 kg (181 lb)   LMP 04/27/2023 (Approximate)   SpO2 98%   BMI 28.35 kg/m    Body mass index is 28.35 kg/m .  Physical Exam                       "

## 2023-06-20 PROBLEM — R74.8 ELEVATED LIVER ENZYMES: Status: ACTIVE | Noted: 2023-06-20

## 2023-06-20 NOTE — ASSESSMENT & PLAN NOTE
Wt Readings from Last 4 Encounters:   06/16/23 82.1 kg (181 lb)   05/22/23 88 kg (194 lb)   05/19/23 88 kg (194 lb)   06/08/20 70.4 kg (155 lb 3.2 oz)     initial weight was 194 on 5/22/2023 she is down 13 pounds from that, initial visit.  Comorbid conditions include PCOS, elevated liver enzymes.  Phenotype or etiology includes insulin resistance, hyperinsulinemia, perhaps even hypercortisolemia and previously high alcohol intake .  Goal: 170s, her long-term goal is to be down into the 140s.     Nutrition: 70- 130 grams of protein, 50-70 gram of CHO foods per day. Eating a whole foods diet, low carb already  Exercise: HIIT, running and cardio 5-6 times per week. Consider adding in Weight training  Medications; phentermine 15 mg is working well, no side effects.  Heart rate and blood pressure are normal ranges.  She is not having any anxiety or other side effects.  She states that she does have a little difficulty falling asleep at night.  But she feels it is worth it for now to continue.    We discussed that there is no significant issues with taking the medications and the alcohol.  However if she feels that this is a time where she would fall back into heavy use then I would recommend that she avoid it during her social activities.  Counseled on all of the labs that were drawn and how they are showing she is early insulin resistance.  I also feel this is likely related to PCOS.  Focused her interventions on lowering those insulin levels.  Recheck labs at the 3-month interval.    Follow-up in early August.

## 2023-06-20 NOTE — ASSESSMENT & PLAN NOTE
AST 38  ALT 63    Elevated unclear as to cause as she has been abstaining from alcohol since February. Likely NAFLD or infectious cause Discussed avoidance of foods that contain fructose, high fructose corn syrup, processed foods andAlcohol.    Recheck labs at the 3 months continue to be elevated then or higher then I would recommend  checking labs for secondary causes and a liver ultrasound.

## 2023-08-23 ENCOUNTER — LAB (OUTPATIENT)
Dept: LAB | Facility: CLINIC | Age: 28
End: 2023-08-23
Payer: COMMERCIAL

## 2023-08-23 ENCOUNTER — MYC MEDICAL ADVICE (OUTPATIENT)
Dept: FAMILY MEDICINE | Facility: CLINIC | Age: 28
End: 2023-08-23

## 2023-08-23 DIAGNOSIS — R74.8 ELEVATED LIVER ENZYMES: ICD-10-CM

## 2023-08-23 DIAGNOSIS — E66.811 CLASS 1 OBESITY: ICD-10-CM

## 2023-08-23 LAB
ALBUMIN SERPL BCG-MCNC: 4.7 G/DL (ref 3.5–5.2)
ALP SERPL-CCNC: 91 U/L (ref 35–104)
ALT SERPL W P-5'-P-CCNC: 18 U/L (ref 0–50)
ANION GAP SERPL CALCULATED.3IONS-SCNC: 10 MMOL/L (ref 7–15)
AST SERPL W P-5'-P-CCNC: 18 U/L (ref 0–45)
BILIRUB SERPL-MCNC: 0.6 MG/DL
BUN SERPL-MCNC: 9.5 MG/DL (ref 6–20)
CALCIUM SERPL-MCNC: 9.4 MG/DL (ref 8.6–10)
CHLORIDE SERPL-SCNC: 104 MMOL/L (ref 98–107)
CREAT SERPL-MCNC: 0.67 MG/DL (ref 0.51–0.95)
DEPRECATED HCO3 PLAS-SCNC: 25 MMOL/L (ref 22–29)
GFR SERPL CREATININE-BSD FRML MDRD: >90 ML/MIN/1.73M2
GLUCOSE SERPL-MCNC: 103 MG/DL (ref 70–99)
HBA1C MFR BLD: 5.2 % (ref 0–5.6)
INSULIN SERPL-ACNC: 13.5 UU/ML (ref 2.6–24.9)
POTASSIUM SERPL-SCNC: 4.4 MMOL/L (ref 3.4–5.3)
PROT SERPL-MCNC: 7.6 G/DL (ref 6.4–8.3)
SODIUM SERPL-SCNC: 139 MMOL/L (ref 136–145)

## 2023-08-23 PROCEDURE — 80053 COMPREHEN METABOLIC PANEL: CPT

## 2023-08-23 PROCEDURE — 36415 COLL VENOUS BLD VENIPUNCTURE: CPT

## 2023-08-23 PROCEDURE — 83036 HEMOGLOBIN GLYCOSYLATED A1C: CPT

## 2023-08-23 PROCEDURE — 83525 ASSAY OF INSULIN: CPT

## 2023-09-11 DIAGNOSIS — E28.2 PCOS (POLYCYSTIC OVARIAN SYNDROME): ICD-10-CM

## 2023-09-11 RX ORDER — METFORMIN HCL 500 MG
500 TABLET, EXTENDED RELEASE 24 HR ORAL
Qty: 90 TABLET | Refills: 0 | Status: SHIPPED | OUTPATIENT
Start: 2023-09-11 | End: 2023-10-11

## 2023-10-11 ENCOUNTER — OFFICE VISIT (OUTPATIENT)
Dept: FAMILY MEDICINE | Facility: CLINIC | Age: 28
End: 2023-10-11
Attending: NURSE PRACTITIONER
Payer: COMMERCIAL

## 2023-10-11 VITALS
HEIGHT: 67 IN | SYSTOLIC BLOOD PRESSURE: 128 MMHG | OXYGEN SATURATION: 98 % | HEART RATE: 88 BPM | DIASTOLIC BLOOD PRESSURE: 80 MMHG | TEMPERATURE: 99.3 F | BODY MASS INDEX: 27.47 KG/M2 | RESPIRATION RATE: 16 BRPM | WEIGHT: 175 LBS

## 2023-10-11 DIAGNOSIS — Z13.220 LIPID SCREENING: ICD-10-CM

## 2023-10-11 DIAGNOSIS — E28.2 PCOS (POLYCYSTIC OVARIAN SYNDROME): ICD-10-CM

## 2023-10-11 DIAGNOSIS — E66.811 CLASS 1 OBESITY: Primary | ICD-10-CM

## 2023-10-11 PROCEDURE — 99213 OFFICE O/P EST LOW 20 MIN: CPT | Performed by: NURSE PRACTITIONER

## 2023-10-11 RX ORDER — METFORMIN HCL 500 MG
500 TABLET, EXTENDED RELEASE 24 HR ORAL
Qty: 90 TABLET | Refills: 3 | Status: SHIPPED | OUTPATIENT
Start: 2023-10-11

## 2023-10-11 NOTE — PROGRESS NOTES
Assessment & Plan   Problem List Items Addressed This Visit       PCOS (polycystic ovarian syndrome)     Continue on the metformin 500 mg         Relevant Medications    metFORMIN (GLUCOPHAGE XR) 500 MG 24 hr tablet    Overweight - Primary     Wt Readings from Last 4 Encounters:   10/11/23 79.4 kg (175 lb)   06/16/23 82.1 kg (181 lb)   05/22/23 88 kg (194 lb)   05/19/23 88 kg (194 lb)   initial weight was 194 on 5/22/2023 she is down 19 pounds from that, initial visit.  She was slightly lower but has had increasing months of travel and has not adhere to a low carbohydrate diet.  She plans on returning after this next vacation to her normal both exercise and diet that she anticipates that she will be able to lose the weight again.  Medication:  Comorbid conditions include PCOS, elevated liver enzymes.  Phenotype or etiology includes insulin resistance, hyperinsulinemia, perhaps even hypercortisolemia and previously high alcohol intake .  Goal: 170s, her long-term goal is to be down into the 140s.     Nutrition: 70- 130 grams of protein, 50-70 gram of CHO foods per day. Eating a whole foods diet, low carb already  Exercise: HIIT, running and cardio 5-6 times per week. Consider adding in Weight training  She did not like how she felt on phentermine so she discontinued it.  She would like to continue trying to lose weight without phentermine.  She would still like to lose about 15 to 20 pounds.          Other Visit Diagnoses       Lipid screening        Relevant Orders    Lipid panel reflex to direct LDL Fasting              FUTURE APPOINTMENTS:       - Follow-up visit in 2-3 months    JERICHO Dykes CNP [facility] is a 28 year old, presenting for the following health issues:  weight management intake      History of Present Illness       Hypertension: She presents for follow up of hypertension.  She does not check blood pressure  regularly outside of the clinic.  "Outside blood pressures have been over 140/90. She follows a low salt diet.     Reason for visit:  Follow up weight loss    She eats 2-3 servings of fruits and vegetables daily.She consumes 1 sweetened beverage(s) daily.She exercises with enough effort to increase her heart rate 20 to 29 minutes per day.  She exercises with enough effort to increase her heart rate 4 days per week. She is missing 1 dose(s) of medications per week.             Weight change since last encounter:down 5 more lbs , 6/2023  Weight change since initial visit: down 19 lbs , (5/22/2023).   Goal: under 170s  Class: overweight; improving   Challenges: has had a lot of travel and stress the past 2 months  Things that went well: overall has been able to keep her weight fairly stable during these past couple of months of travel and not sticking to a low CHO diet  Dietary Adherence: will plan on returning to her diet when she returns from [place]  Exercise: will return to her normal routine when she returns.   Sleep: not great her cat is keeping her up  Stress: higher but managing it  Meeting goal close to her goal.  Medication use: discontinued Phentermine from the side effects.  Comorbid conditions include: PCOS    Review of Systems   All other systems reviewed and are negative.           Objective    /80 (BP Location: Right arm, Patient Position: Sitting, Cuff Size: Adult Large)   Pulse 88   Temp 99.3  F (37.4  C) (Tympanic)   Resp 16   Ht 1.702 m (5' 7\")   Wt 79.4 kg (175 lb)   LMP 09/20/2023 (Approximate)   SpO2 98%   BMI 27.41 kg/m    Body mass index is 27.41 kg/m .  Physical Exam  Constitutional:       Appearance: Normal appearance.   HENT:      Head: Normocephalic.   Pulmonary:      Effort: Pulmonary effort is normal. No respiratory distress.   Skin:     General: Skin is warm and dry.   Neurological:      Mental Status: She is alert and oriented to person, place, and time.   Psychiatric:         Mood and Affect: Mood " normal.         Behavior: Behavior normal.         Thought Content: Thought content normal.         Judgment: Judgment normal.                            Lifestyle Weight Management Intake    Question 10/11/2023  3:21 PM CDT - Filed by Patient   I understand that completing this form is intended to provide my doctor and/or care team with helpful information for my upcoming clinic visit. It is not to notify my doctor and/or care team of medical matters requiring urgent attention. If I have an urgent medical matter, I should call 911 or my doctor's office. Acknowledge   LIFESTYLE MEDICINE PROGRAM FOR WEIGHT MANAGEMENT INTAKE FORM    Were you referred here? No   If you were referred here who referred you?    Please describe your reasons for coming to this weight management program: follow up visit   PERSONAL HISTORY OF WEIGHT MANAGEMENT    Have you tried other weight loss programs?  Please check all that apply:    Please list any other weight loss programs that you have tried:    Have you done any of the following things in order to lose weight or keep from gaining weight?    Eating Habits    Stress makes me eat Mostly true   Feeling sad makes me eat Mostly false   Feeling lonely makes me eat Mostly false   Do you go on eating binges even though you are not hungry? No   Physical Function    Does your health limit you in doing vigorous activities, such as running, lifting heavy objects, participating in strenuous sports ? Not at all   Does your health limit you in walking more than a mile? Not at all   Does your health limit you in climbing one flight of stairs? Not at all   Does your health limit you in bending, kneeling or stooping? Not at all   Do you have chest pain or shortness of breath when you walk and/or exercise in other ways? No   Do you have pain in your lower back, hips, knees, ankles or feet that limits your ability to walk and/or exercise in other ways? No   Physical Activity    During a typical 7 day  period (a week), how many times on the average do you do the following kinds of exercise for more than 15 minutes during your free time:    STRENUOUS EXERCISE (HEART BEATS RAPIDLY) Number of times per week 1   MODERATE EXERCISE (NOT EXHAUSTING) Number of times per week 2   MILD EXERCISE (MINIMAL EFFECT) Number of times per week 1   Do you have access to a gymnasium? Yes   In a typical work day, how many minutes per day do you spend walking?  (range: 1 - 300)    Do you have home exercise equipment? If yes, please list Treadmill    Elipticle    Stationary bike   Sedentary Activity    On a typical WEEKDAY, how much time do you spend sitting (from when you wake up until you go to bed)? 3-4 hours   On a typical WEEKEND DAY , how much time do you spend sitting (from when you wake up until you go to bed)? 5-6 hours   SLEEP HABITS    The following questions relate to your usual sleep habits during the past month only.  Your answers should indicate the most accurate reply for the majority of days and nights in the past month.      Do you have a scale at home? Yes   How many hours of sleep do you get in a typical night? 7   Have you ever been told you have sleep apnea? No   Do you generally get up in the morning feeling rested and ready for the day? Yes   I go to bed early so that I can feel better the next day 2 = Moderately disagree   I try to find time for exercises that make me feel good 4 = Moderately agree   Do you snore at night? Yes   Does sleepiness during the day affect your ability to function at work? Yes   Have you ever fallen asleep while driving? No   Have you ever fallen asleep during work? No     Answers submitted by the patient for this visit:  Hypertension Visit (Submitted on 10/11/2023)  Chief Complaint: Chronic problems general questions HPI Form  Do you check your blood pressure regularly outside of the clinic?: No  Are your blood pressures ever more than 140 on the top number (systolic) OR more than 90  on the bottom number (diastolic)? (For example, greater than 140/90): Yes  Are you following a low salt diet?: Yes  General Questionnaire (Submitted on 10/11/2023)  Chief Complaint: Chronic problems general questions HPI Form  What is the reason for your visit today? : follow up weight loss  How many servings of fruits and vegetables do you eat daily?: 2-3  On average, how many sweetened beverages do you drink each day (Examples: soda, juice, sweet tea, etc.  Do NOT count diet or artificially sweetened beverages)?: 1  How many minutes a day do you exercise enough to make your heart beat faster?: 20 to 29  How many days a week do you exercise enough to make your heart beat faster?: 4  How many days per week do you miss taking your medication?: 1

## 2023-10-14 NOTE — ASSESSMENT & PLAN NOTE
Wt Readings from Last 4 Encounters:   10/11/23 79.4 kg (175 lb)   06/16/23 82.1 kg (181 lb)   05/22/23 88 kg (194 lb)   05/19/23 88 kg (194 lb)     initial weight was 194 on 5/22/2023 she is down 19 pounds from that, initial visit.  She was slightly lower but has had increasing months of travel and has not adhere to a low carbohydrate diet.  She plans on returning after this next vacation to her normal both exercise and diet that she anticipates that she will be able to lose the weight again.  Medication:  Comorbid conditions include PCOS, elevated liver enzymes.  Phenotype or etiology includes insulin resistance, hyperinsulinemia, perhaps even hypercortisolemia and previously high alcohol intake .  Goal: 170s, her long-term goal is to be down into the 140s.     Nutrition: 70- 130 grams of protein, 50-70 gram of CHO foods per day. Eating a whole foods diet, low carb already  Exercise: HIIT, running and cardio 5-6 times per week. Consider adding in Weight training  She did not like how she felt on phentermine so she discontinued it.  She would like to continue trying to lose weight without phentermine.  She would still like to lose about 15 to 20 pounds.

## 2023-10-27 ENCOUNTER — OFFICE VISIT (OUTPATIENT)
Dept: PODIATRY | Facility: CLINIC | Age: 28
End: 2023-10-27
Payer: COMMERCIAL

## 2023-10-27 DIAGNOSIS — L60.0 INGROWING NAIL: Primary | ICD-10-CM

## 2023-10-27 PROBLEM — N92.6 IRREGULAR PERIODS: Status: ACTIVE | Noted: 2022-07-27

## 2023-10-27 PROBLEM — R03.0 ELEVATED BLOOD PRESSURE READING WITHOUT DIAGNOSIS OF HYPERTENSION: Status: ACTIVE | Noted: 2022-08-02

## 2023-10-27 PROCEDURE — 11730 AVULSION NAIL PLATE SIMPLE 1: CPT | Mod: T5 | Performed by: PODIATRIST

## 2023-10-27 PROCEDURE — 99203 OFFICE O/P NEW LOW 30 MIN: CPT | Mod: 25 | Performed by: PODIATRIST

## 2023-10-27 NOTE — PATIENT INSTRUCTIONS
We wish you continued good healing. If you have any questions or concerns, please do not hesitate to contact us at  [phone]    PermissionTVt (secure e-mail communication and access to your chart) to send a message or to make an appointment.    Please remember to call and schedule a follow up appointment if one was recommended at your earliest convenience.     PODIATRY CLINIC HOURS  TELEPHONE NUMBER    Dr. Javed COMERPARTIE FACFAS        Clinics:  [facility]  Tuesday 1PM-6PM  [place]  Wednesday 745AM-330PM  [place]  Monday 2nd,4th  830AM-4PM  Thursday/Friday 745AM-230PM     [facility] APPOINTMENTS  [phone]    [place] APPOINTMENTS  [phone]          If you need a medication refill, please contact us you may need lab work and/or a follow up visit prior to your refill (i.e. Antifungal medications).  If MRI needed please call Imaging at [phone]   HOW DO I GET MY KNEE SCOOTER? Knee scooters can be picked up at ANY Medical Supply stores with your knee scooter Prescription.  OR  Bring your signed prescription to an [facility] Medical [facility] showroom.   Set up an appointment for your custom Orthotics. Call any Orthotics locations call [phone]         [facility] TOENAIL REMOVAL AFTERCARE     Go directly home and elevate the affected foot on one or two pillows for the remainder of the day/evening if possible. Your toe may stay numb anywhere from 2-8 hours.   Take Tylenol, ibuprofen or another anti-inflammatory as needed for pain.   That evening of the procedure,  you may remove the bandage.(you may soak it in warm soapy water ) After soaks, pat the area dry and then allow to airdry for a few minutes. Apply antibiotic ointment to the area and cover with  band-aid.  The following day. Find a shoe that is comfortable and minimizes the amount of rubbing on your toe, as this increases pain.  Dress with band-aids until no  longer draining, typically 3 days.  Watch for any signs and symptoms of infection such as: redness, red streaks going up the foot/leg, swelling, pus or foul odor. Fevers > 101   Please call with questions.    Dr. Javed Starks D.P.M FAC FAS

## 2023-10-27 NOTE — LETTER
10/27/2023         RE: Tammi Barry  [address] [id number]        Dear Colleague,    Thank you for referring your patient, Tammi Barry, to the [facility]. Please see a copy of my visit note below.    Subjective:    Pt is seen today as a new pt referral w/ the c/c of a painful ingrown right great nail lateral border.  This has been problematic for 1 month(s).  positivehistory of drainage from the site. This is slowly getting worse.  Aggravated by activity and relieved by rest.  Has tried soaking which has not helped.   Patient is on her feet at work.  She likes to play pickle ball which makes this worse.  Denies fever and chill, denies numbness and tingling, denies erythema on dorsum of foot.     ROS:  see above    No past medical history on file.    Past Surgical History:   Procedure Laterality Date     ELBOW SURGERY  2009    titanium pin in place       Family History   Problem Relation Age of Onset     Pituitary Disorder Mother         adenoma     Asthma Father      Hypertension Paternal Grandmother        Social History     Tobacco Use     Smoking status: Never     Smokeless tobacco: Never   Substance Use Topics     Alcohol use: Yes     Comment: occas         Current Outpatient Medications:      metFORMIN (GLUCOPHAGE XR) 500 MG 24 hr tablet, Take 1 tablet (500 mg) by mouth daily (with dinner), Disp: 90 tablet, Rfl: 3     paragard intrauterine copper device, 1 each by Intrauterine route continuous, Disp: , Rfl:      No Known Allergies    LMP 09/20/2023 (Approximate) .      Constitutional/ general:  Pt is in no apparent distress, appears well-nourished.  Cooperative with history and physical exam.     Psych:  The patient answered questions appropriately.  Normal affect.  Seems to have reasonable expectations, in terms of treatment.     Lungs:  Non labored breathing, non labored speech. No cough.  No audible wheezing. Even, quiet breathing.       Vascular:   Pedal pulses are palpable bilaterally for both the DP and PT arteries.  CFT < 3 sec.  No ankle varicosities or edema.  Pedal hair growth noted.     Neuro:  Alert and oriented x 3. Coordinated gait.  Light touch sensation is intact     Derm: Normal texture and turgor.  No ecchymosis, or cyanosis.  Hair growth noted.        Musculoskeletal:     Patient is ambulatory without an assistive device or brace.   right great toe nail lateral border shows soft tissue impingement with localized erythema.   negative active drainage/purulence at this time.  No sinus tracts.  No nailbed masses or exostosis.  No pain with range of motion of IPJ or MTPJ.  No ascending cellulitis.    ASSESSMENT:    Onychocryptosis with paronychia right toe.    Discussed etiology and treatment options in detail w/ the pt.  The potential causes and nature of an ingrown toenail were discussed with the patient.  We reviewed the natural history/prognosis of the condition and potential risks if no treatment is provided.      After thorough discussion and answering all questions, the patient elected to have nail avulsion.  Obtained consent, used 3cc of 1% lidocaine plain to block right first toe.  Sterile prep, then avulsed the affected border(s).  No evidence of deep abscess noted.  Pt tolerated procedure well.  Sterile bandage placed, gave wound care instruction.  Instructed patient on trimming nails correctly.  They will avoid tight shoes.  Avoid pedicures.  Discussed permanent removal of border if chronic problem.  Return to clinic prn.    Javed Starks DPM, FACFAS        Again, thank you for allowing me to participate in the care of your patient.        Sincerely,        Javed Starks DPM

## 2023-10-27 NOTE — PROGRESS NOTES
Subjective:    Pt is seen today as a new pt referral w/ the c/c of a painful ingrown right great nail lateral border.  This has been problematic for 1 month(s).  positivehistory of drainage from the site. This is slowly getting worse.  Aggravated by activity and relieved by rest.  Has tried soaking which has not helped.   Patient is on her feet at work.  She likes to play pickle ball which makes this worse.  Denies fever and chill, denies numbness and tingling, denies erythema on dorsum of foot.     ROS:  see above    No past medical history on file.    Past Surgical History:   Procedure Laterality Date    ELBOW SURGERY  2009    titanium pin in place       Family History   Problem Relation Age of Onset    Pituitary Disorder Mother         adenoma    Asthma Father     Hypertension Paternal Grandmother        Social History     Tobacco Use    Smoking status: Never    Smokeless tobacco: Never   Substance Use Topics    Alcohol use: Yes     Comment: occas         Current Outpatient Medications:     metFORMIN (GLUCOPHAGE XR) 500 MG 24 hr tablet, Take 1 tablet (500 mg) by mouth daily (with dinner), Disp: 90 tablet, Rfl: 3    paragard intrauterine copper device, 1 each by Intrauterine route continuous, Disp: , Rfl:      No Known Allergies    LMP 09/20/2023 (Approximate) .      Constitutional/ general:  Pt is in no apparent distress, appears well-nourished.  Cooperative with history and physical exam.     Psych:  The patient answered questions appropriately.  Normal affect.  Seems to have reasonable expectations, in terms of treatment.     Lungs:  Non labored breathing, non labored speech. No cough.  No audible wheezing. Even, quiet breathing.       Vascular:  Pedal pulses are palpable bilaterally for both the DP and PT arteries.  CFT < 3 sec.  No ankle varicosities or edema.  Pedal hair growth noted.     Neuro:  Alert and oriented x 3. Coordinated gait.  Light touch sensation is intact     Derm: Normal texture and turgor.   No ecchymosis, or cyanosis.  Hair growth noted.        Musculoskeletal:     Patient is ambulatory without an assistive device or brace.   right great toe nail lateral border shows soft tissue impingement with localized erythema.   negative active drainage/purulence at this time.  No sinus tracts.  No nailbed masses or exostosis.  No pain with range of motion of IPJ or MTPJ.  No ascending cellulitis.    ASSESSMENT:    Onychocryptosis with paronychia right toe.    Discussed etiology and treatment options in detail w/ the pt.  The potential causes and nature of an ingrown toenail were discussed with the patient.  We reviewed the natural history/prognosis of the condition and potential risks if no treatment is provided.      After thorough discussion and answering all questions, the patient elected to have nail avulsion.  Obtained consent, used 3cc of 1% lidocaine plain to block right first toe.  Sterile prep, then avulsed the affected border(s).  No evidence of deep abscess noted.  Pt tolerated procedure well.  Sterile bandage placed, gave wound care instruction.  Instructed patient on trimming nails correctly.  They will avoid tight shoes.  Avoid pedicures.  Discussed permanent removal of border if chronic problem.  Return to clinic prn.    Javed Starks DPM, FACFAS

## 2023-11-08 NOTE — PROGRESS NOTES
Tammi Barry has an upcoming lab appointment:    Future Appointments   Date Time Provider Department Center   11/27/2023  8:30 AM [facility]   1/8/2024  8:30 AM Phylicia Hernandez APRN CNP [facility]     Patient is scheduled for labs and there is no order available. Please review and place either future orders or HMPO (Review of Health Maintenance Protocol Orders), as appropriate.    There are no preventive care reminders to display for this patient.    Thank you  Tammi Roa

## 2024-02-06 ENCOUNTER — MYC REFILL (OUTPATIENT)
Dept: FAMILY MEDICINE | Facility: CLINIC | Age: 29
End: 2024-02-06
Payer: COMMERCIAL

## 2024-02-06 DIAGNOSIS — E28.2 PCOS (POLYCYSTIC OVARIAN SYNDROME): ICD-10-CM

## 2024-02-07 RX ORDER — METFORMIN HCL 500 MG
500 TABLET, EXTENDED RELEASE 24 HR ORAL
Qty: 90 TABLET | Refills: 3 | OUTPATIENT
Start: 2024-02-07

## 2024-04-19 ENCOUNTER — PATIENT OUTREACH (OUTPATIENT)
Dept: CARE COORDINATION | Facility: CLINIC | Age: 29
End: 2024-04-19
Payer: COMMERCIAL

## 2024-05-03 ENCOUNTER — PATIENT OUTREACH (OUTPATIENT)
Dept: CARE COORDINATION | Facility: CLINIC | Age: 29
End: 2024-05-03
Payer: COMMERCIAL

## 2024-08-25 ENCOUNTER — HEALTH MAINTENANCE LETTER (OUTPATIENT)
Age: 29
End: 2024-08-25

## 2024-11-15 ENCOUNTER — PATIENT OUTREACH (OUTPATIENT)
Dept: CARE COORDINATION | Facility: CLINIC | Age: 29
End: 2024-11-15
Payer: COMMERCIAL